# Patient Record
Sex: FEMALE | Race: WHITE | NOT HISPANIC OR LATINO | Employment: FULL TIME | URBAN - METROPOLITAN AREA
[De-identification: names, ages, dates, MRNs, and addresses within clinical notes are randomized per-mention and may not be internally consistent; named-entity substitution may affect disease eponyms.]

---

## 2017-03-24 ENCOUNTER — APPOINTMENT (OUTPATIENT)
Dept: WOMENS IMAGING | Facility: HOSPITAL | Age: 22
End: 2017-03-24

## 2017-03-24 PROCEDURE — 76641 ULTRASOUND BREAST COMPLETE: CPT | Performed by: RADIOLOGY

## 2018-07-20 ENCOUNTER — APPOINTMENT (OUTPATIENT)
Dept: WOMENS IMAGING | Facility: HOSPITAL | Age: 23
End: 2018-07-20

## 2018-07-20 PROCEDURE — 76641 ULTRASOUND BREAST COMPLETE: CPT | Performed by: RADIOLOGY

## 2022-11-01 ENCOUNTER — APPOINTMENT (OUTPATIENT)
Dept: CT IMAGING | Facility: HOSPITAL | Age: 27
End: 2022-11-01

## 2022-11-01 ENCOUNTER — HOSPITAL ENCOUNTER (OUTPATIENT)
Facility: HOSPITAL | Age: 27
Discharge: HOME | End: 2022-11-03
Attending: EMERGENCY MEDICINE | Admitting: OTOLARYNGOLOGY

## 2022-11-01 DIAGNOSIS — S10.0XXA: Primary | ICD-10-CM

## 2022-11-01 DIAGNOSIS — J39.0 PARAPHARYNGEAL ABSCESS: ICD-10-CM

## 2022-11-01 DIAGNOSIS — J39.1 PHARYNGEAL ABSCESS: ICD-10-CM

## 2022-11-01 LAB
ALBUMIN SERPL-MCNC: 4.2 G/DL (ref 3.5–5.2)
ALBUMIN/GLOB SERPL: 1.2 G/DL
ALP SERPL-CCNC: 53 U/L (ref 39–117)
ALT SERPL W P-5'-P-CCNC: 9 U/L (ref 1–33)
ANION GAP SERPL CALCULATED.3IONS-SCNC: 8.3 MMOL/L (ref 5–15)
AST SERPL-CCNC: 15 U/L (ref 1–32)
BASOPHILS # BLD AUTO: 0.04 10*3/MM3 (ref 0–0.2)
BASOPHILS NFR BLD AUTO: 0.2 % (ref 0–1.5)
BILIRUB SERPL-MCNC: 1.2 MG/DL (ref 0–1.2)
BUN SERPL-MCNC: 10 MG/DL (ref 6–20)
BUN/CREAT SERPL: 9.8 (ref 7–25)
CALCIUM SPEC-SCNC: 9.9 MG/DL (ref 8.6–10.5)
CHLORIDE SERPL-SCNC: 104 MMOL/L (ref 98–107)
CO2 SERPL-SCNC: 26.7 MMOL/L (ref 22–29)
CREAT SERPL-MCNC: 1.02 MG/DL (ref 0.57–1)
D-LACTATE SERPL-SCNC: 0.8 MMOL/L (ref 0.5–2)
DEPRECATED RDW RBC AUTO: 38.7 FL (ref 37–54)
EGFRCR SERPLBLD CKD-EPI 2021: 76.5 ML/MIN/1.73
EOSINOPHIL # BLD AUTO: 0.02 10*3/MM3 (ref 0–0.4)
EOSINOPHIL NFR BLD AUTO: 0.1 % (ref 0.3–6.2)
ERYTHROCYTE [DISTWIDTH] IN BLOOD BY AUTOMATED COUNT: 11.6 % (ref 12.3–15.4)
GLOBULIN UR ELPH-MCNC: 3.5 GM/DL
GLUCOSE SERPL-MCNC: 125 MG/DL (ref 65–99)
HCG SERPL QL: NEGATIVE
HCT VFR BLD AUTO: 40.8 % (ref 34–46.6)
HGB BLD-MCNC: 14.2 G/DL (ref 12–15.9)
IMM GRANULOCYTES # BLD AUTO: 0.07 10*3/MM3 (ref 0–0.05)
IMM GRANULOCYTES NFR BLD AUTO: 0.4 % (ref 0–0.5)
LYMPHOCYTES # BLD AUTO: 1.95 10*3/MM3 (ref 0.7–3.1)
LYMPHOCYTES NFR BLD AUTO: 11.9 % (ref 19.6–45.3)
MCH RBC QN AUTO: 31.4 PG (ref 26.6–33)
MCHC RBC AUTO-ENTMCNC: 34.8 G/DL (ref 31.5–35.7)
MCV RBC AUTO: 90.3 FL (ref 79–97)
MONOCYTES # BLD AUTO: 0.94 10*3/MM3 (ref 0.1–0.9)
MONOCYTES NFR BLD AUTO: 5.7 % (ref 5–12)
NEUTROPHILS NFR BLD AUTO: 13.35 10*3/MM3 (ref 1.7–7)
NEUTROPHILS NFR BLD AUTO: 81.7 % (ref 42.7–76)
NRBC BLD AUTO-RTO: 0 /100 WBC (ref 0–0.2)
PLATELET # BLD AUTO: 297 10*3/MM3 (ref 140–450)
PMV BLD AUTO: 10.1 FL (ref 6–12)
POTASSIUM SERPL-SCNC: 3.7 MMOL/L (ref 3.5–5.2)
PROT SERPL-MCNC: 7.7 G/DL (ref 6–8.5)
RBC # BLD AUTO: 4.52 10*6/MM3 (ref 3.77–5.28)
SODIUM SERPL-SCNC: 139 MMOL/L (ref 136–145)
WBC NRBC COR # BLD: 16.37 10*3/MM3 (ref 3.4–10.8)

## 2022-11-01 PROCEDURE — 25010000002 IOPAMIDOL 61 % SOLUTION: Performed by: EMERGENCY MEDICINE

## 2022-11-01 PROCEDURE — 96375 TX/PRO/DX INJ NEW DRUG ADDON: CPT

## 2022-11-01 PROCEDURE — 83605 ASSAY OF LACTIC ACID: CPT | Performed by: EMERGENCY MEDICINE

## 2022-11-01 PROCEDURE — 87040 BLOOD CULTURE FOR BACTERIA: CPT | Performed by: EMERGENCY MEDICINE

## 2022-11-01 PROCEDURE — 25010000002 DEXAMETHASONE PER 1 MG: Performed by: PHYSICIAN ASSISTANT

## 2022-11-01 PROCEDURE — G0378 HOSPITAL OBSERVATION PER HR: HCPCS

## 2022-11-01 PROCEDURE — 25010000002 HYDROMORPHONE PER 4 MG: Performed by: EMERGENCY MEDICINE

## 2022-11-01 PROCEDURE — 96365 THER/PROPH/DIAG IV INF INIT: CPT

## 2022-11-01 PROCEDURE — 80053 COMPREHEN METABOLIC PANEL: CPT | Performed by: PHYSICIAN ASSISTANT

## 2022-11-01 PROCEDURE — 70498 CT ANGIOGRAPHY NECK: CPT

## 2022-11-01 PROCEDURE — 82565 ASSAY OF CREATININE: CPT

## 2022-11-01 PROCEDURE — 85025 COMPLETE CBC W/AUTO DIFF WBC: CPT | Performed by: PHYSICIAN ASSISTANT

## 2022-11-01 PROCEDURE — 99284 EMERGENCY DEPT VISIT MOD MDM: CPT

## 2022-11-01 PROCEDURE — 84703 CHORIONIC GONADOTROPIN ASSAY: CPT | Performed by: PHYSICIAN ASSISTANT

## 2022-11-01 PROCEDURE — 25010000002 ONDANSETRON PER 1 MG: Performed by: EMERGENCY MEDICINE

## 2022-11-01 PROCEDURE — 25010000002 AMPICILLIN-SULBACTAM PER 1.5 G: Performed by: EMERGENCY MEDICINE

## 2022-11-01 RX ORDER — UREA 10 %
3 LOTION (ML) TOPICAL NIGHTLY PRN
Status: DISCONTINUED | OUTPATIENT
Start: 2022-11-01 | End: 2022-11-03 | Stop reason: HOSPADM

## 2022-11-01 RX ORDER — ONDANSETRON 4 MG/1
4 TABLET, FILM COATED ORAL EVERY 6 HOURS PRN
Status: DISCONTINUED | OUTPATIENT
Start: 2022-11-01 | End: 2022-11-03 | Stop reason: HOSPADM

## 2022-11-01 RX ORDER — ACETAMINOPHEN 325 MG/1
650 TABLET ORAL EVERY 4 HOURS PRN
Status: DISCONTINUED | OUTPATIENT
Start: 2022-11-01 | End: 2022-11-03 | Stop reason: HOSPADM

## 2022-11-01 RX ORDER — NITROGLYCERIN 0.4 MG/1
0.4 TABLET SUBLINGUAL
Status: DISCONTINUED | OUTPATIENT
Start: 2022-11-01 | End: 2022-11-03 | Stop reason: HOSPADM

## 2022-11-01 RX ORDER — DIPHENHYDRAMINE HYDROCHLORIDE AND LIDOCAINE HYDROCHLORIDE AND ALUMINUM HYDROXIDE AND MAGNESIUM HYDRO
10 KIT EVERY 6 HOURS SCHEDULED
Status: DISCONTINUED | OUTPATIENT
Start: 2022-11-02 | End: 2022-11-03 | Stop reason: HOSPADM

## 2022-11-01 RX ORDER — SODIUM CHLORIDE 0.9 % (FLUSH) 0.9 %
10 SYRINGE (ML) INJECTION AS NEEDED
Status: DISCONTINUED | OUTPATIENT
Start: 2022-11-01 | End: 2022-11-03 | Stop reason: HOSPADM

## 2022-11-01 RX ORDER — HYDROMORPHONE HYDROCHLORIDE 1 MG/ML
0.5 INJECTION, SOLUTION INTRAMUSCULAR; INTRAVENOUS; SUBCUTANEOUS EVERY 4 HOURS PRN
Status: DISCONTINUED | OUTPATIENT
Start: 2022-11-01 | End: 2022-11-02

## 2022-11-01 RX ORDER — ONDANSETRON 2 MG/ML
4 INJECTION INTRAMUSCULAR; INTRAVENOUS ONCE
Status: COMPLETED | OUTPATIENT
Start: 2022-11-01 | End: 2022-11-01

## 2022-11-01 RX ORDER — DEXAMETHASONE SODIUM PHOSPHATE 4 MG/ML
4 INJECTION, SOLUTION INTRA-ARTICULAR; INTRALESIONAL; INTRAMUSCULAR; INTRAVENOUS; SOFT TISSUE EVERY 8 HOURS
Status: DISCONTINUED | OUTPATIENT
Start: 2022-11-01 | End: 2022-11-03 | Stop reason: HOSPADM

## 2022-11-01 RX ORDER — ONDANSETRON 2 MG/ML
4 INJECTION INTRAMUSCULAR; INTRAVENOUS EVERY 6 HOURS PRN
Status: DISCONTINUED | OUTPATIENT
Start: 2022-11-01 | End: 2022-11-03 | Stop reason: HOSPADM

## 2022-11-01 RX ORDER — DEXAMETHASONE SODIUM PHOSPHATE 10 MG/ML
10 INJECTION INTRAMUSCULAR; INTRAVENOUS ONCE
Status: COMPLETED | OUTPATIENT
Start: 2022-11-01 | End: 2022-11-01

## 2022-11-01 RX ORDER — HYDROMORPHONE HYDROCHLORIDE 1 MG/ML
0.5 INJECTION, SOLUTION INTRAMUSCULAR; INTRAVENOUS; SUBCUTANEOUS ONCE
Status: COMPLETED | OUTPATIENT
Start: 2022-11-01 | End: 2022-11-01

## 2022-11-01 RX ADMIN — IOPAMIDOL 95 ML: 612 INJECTION, SOLUTION INTRAVENOUS at 14:15

## 2022-11-01 RX ADMIN — HYDROMORPHONE HYDROCHLORIDE 0.5 MG: 1 INJECTION, SOLUTION INTRAMUSCULAR; INTRAVENOUS; SUBCUTANEOUS at 15:21

## 2022-11-01 RX ADMIN — ONDANSETRON 4 MG: 2 INJECTION INTRAMUSCULAR; INTRAVENOUS at 15:21

## 2022-11-01 RX ADMIN — SODIUM CHLORIDE 1.5 G: 900 INJECTION INTRAVENOUS at 17:08

## 2022-11-01 RX ADMIN — DEXAMETHASONE SODIUM PHOSPHATE 10 MG: 10 INJECTION INTRAMUSCULAR; INTRAVENOUS at 15:10

## 2022-11-01 NOTE — ED PROVIDER NOTES
MD ATTESTATION NOTE    The ORALIA and I have discussed this patient's history, physical exam, and treatment plan.  I have reviewed the documentation and personally had a face to face interaction with the patient. I affirm the documentation and agree with the treatment and plan.  The attached note describes my personal findings.    I provided a substantive portion of the care of this patient. I personally performed the physical exam, in its entirety.    Allie Sunshine is a 27 y.o. female who presents to the ED c/o having swelling in her neck after having an accident on Saturday.  She reports on Saturday she was brushing her teeth when she was using the stairs.  She reports she fell.  She reports she jammed her toothbrush into the back of her throat.  She reports since then she has noticed swelling to her left neck and in the posterior aspect of her left throat.  She states today she had difficulty swallowing, change in her voice.  She is not on any blood thinners.  She denies any other injury.      On exam:  GENERAL: Awake, alert, no acute distress  SKIN: Warm, dry  HENT: Normocephalic, posterior pharynx with moderate to large swelling in the left.  There is asymmetry.  She has a hoarse voice.  She is managing her secretions  EYES: no scleral icterus  CV: regular rhythm, regular rate  RESPIRATORY: normal effort, lungs clear  ABDOMEN: soft, nontender, nondistended  MUSCULOSKELETAL: no deformity  NEURO: alert, moves all extremities, follows commands    Labs  Recent Results (from the past 24 hour(s))   Comprehensive Metabolic Panel    Collection Time: 11/01/22  2:14 PM    Specimen: Blood   Result Value Ref Range    Glucose 125 (H) 65 - 99 mg/dL    BUN 10 6 - 20 mg/dL    Creatinine 1.02 (H) 0.57 - 1.00 mg/dL    Sodium 139 136 - 145 mmol/L    Potassium 3.7 3.5 - 5.2 mmol/L    Chloride 104 98 - 107 mmol/L    CO2 26.7 22.0 - 29.0 mmol/L    Calcium 9.9 8.6 - 10.5 mg/dL    Total Protein 7.7 6.0 - 8.5 g/dL    Albumin 4.20 3.50 -  5.20 g/dL    ALT (SGPT) 9 1 - 33 U/L    AST (SGOT) 15 1 - 32 U/L    Alkaline Phosphatase 53 39 - 117 U/L    Total Bilirubin 1.2 0.0 - 1.2 mg/dL    Globulin 3.5 gm/dL    A/G Ratio 1.2 g/dL    BUN/Creatinine Ratio 9.8 7.0 - 25.0    Anion Gap 8.3 5.0 - 15.0 mmol/L    eGFR 76.5 >60.0 mL/min/1.73   hCG, Serum, Qualitative    Collection Time: 11/01/22  2:14 PM    Specimen: Blood   Result Value Ref Range    HCG Qualitative Negative Negative   CBC Auto Differential    Collection Time: 11/01/22  2:14 PM    Specimen: Blood   Result Value Ref Range    WBC 16.37 (H) 3.40 - 10.80 10*3/mm3    RBC 4.52 3.77 - 5.28 10*6/mm3    Hemoglobin 14.2 12.0 - 15.9 g/dL    Hematocrit 40.8 34.0 - 46.6 %    MCV 90.3 79.0 - 97.0 fL    MCH 31.4 26.6 - 33.0 pg    MCHC 34.8 31.5 - 35.7 g/dL    RDW 11.6 (L) 12.3 - 15.4 %    RDW-SD 38.7 37.0 - 54.0 fl    MPV 10.1 6.0 - 12.0 fL    Platelets 297 140 - 450 10*3/mm3    Neutrophil % 81.7 (H) 42.7 - 76.0 %    Lymphocyte % 11.9 (L) 19.6 - 45.3 %    Monocyte % 5.7 5.0 - 12.0 %    Eosinophil % 0.1 (L) 0.3 - 6.2 %    Basophil % 0.2 0.0 - 1.5 %    Immature Grans % 0.4 0.0 - 0.5 %    Neutrophils, Absolute 13.35 (H) 1.70 - 7.00 10*3/mm3    Lymphocytes, Absolute 1.95 0.70 - 3.10 10*3/mm3    Monocytes, Absolute 0.94 (H) 0.10 - 0.90 10*3/mm3    Eosinophils, Absolute 0.02 0.00 - 0.40 10*3/mm3    Basophils, Absolute 0.04 0.00 - 0.20 10*3/mm3    Immature Grans, Absolute 0.07 (H) 0.00 - 0.05 10*3/mm3    nRBC 0.0 0.0 - 0.2 /100 WBC   Lactic Acid, Plasma    Collection Time: 11/01/22  2:47 PM    Specimen: Blood   Result Value Ref Range    Lactate 0.8 0.5 - 2.0 mmol/L       Radiology  CT Angiogram Neck    Result Date: 11/1/2022  CT ANGIOGRAM OF THE NECK WITH CONTRAST  HISTORY: Trauma to left posterior pharynx, hematoma.  COMPARISON: None.  FINDINGS: The great vessels are arranged in a classic configuration. There is 0% stenosis of the internal carotid arteries using NASCET criteria. The vertebral arteries are codominant.   Decreased attenuation is noted involving the parapharyngeal soft tissues on the left with mild peripheral enhancement. This measures approximately 2.9 cm in AP dimension, 2.5 cm in transverse dimension and 2.6 cm in craniocaudal dimension. Decreased attenuation is also appreciated more medially, extending to the midline within the soft palate (more prominent superiorly). There is no evidence of active extravasation. The findings are most suggestive of an abscess or of an evolving abscess. There is a small collection of gas posterior laterally to the left measuring approximately 6 mm in size.      Decreased attenuation with mild peripheral enhancement involving the parapharyngeal soft tissues on the left measuring approximately 2.9 x 2.5 x 2.6 cm in size with adjacent area of decreased attenuation extending to the soft palate more prominent superiorly. The findings are most suggestive of an abscess/developing abscess. This study was hampered somewhat by patient motion but there was no evidence of a pseudoaneurysm or of active extravasation.  The above information was called to and discussed with Dr. Eastman.          Medical Decision Making:  ED Course as of 11/01/22 1756   Tue Nov 01, 2022   1350 Patient presents with left posterior pharyngeal injury after hitting her throat with a toothbrush 3 days ago.  Patient does have a visible expanding hematoma.  Concern for carotid injury.  No active, visible bleeding.  Will obtain CTA scan and discussed with vascular/ENT. [EE]   1357 I have spoken with Dr. Collins as well as Dr. Solis with radiology.  The recommendation is CTA of the neck.  I believe this needs to be performed emergently.  I have called CT to arrange her to be next in line.  We will not wait for pregnancy test or chemistries given the emergent nature of her presentation. [TR]   1434 WBC(!): 16.37 [EE]   1434 Hemoglobin: 14.2 [EE]   1438 Speaking with Dr. Chambers with radiology.  He states that the patient  has findings more consistent with an abscess.  She has a 2.9 x 2.5 x 2.6 parapharyngeal lesion that is crossing the midline at the soft palate.  She has white count of 16,000.  I am going to start her on some Unasyn and call ENT. [TR]   1456 Speaking with Dr. Charles with ENT.  He wants her started on antibiotics and steroids.  He will come to scope her in the emergency room.  I do not feel comfortable admitting her to the floor unless ENT scoped her and gives the okay.  We will hold her here until ENT can scope. [TR]   1621 Care turned over to Dr. Eastman pending ENT evaluation [EE]   1742 Speaking with ENT who has scoped her at the bedside.  He feels she is safe for the floor and does not need ICU care.  He states that he will see her early in the morning and decide further plan of action. [TR]   1755 Speaking with Dr. Muhammad with [facility].  Will admit. [TR]      ED Course User Index  [EE] Charles Gardiner PA  [TR] Davis Eastman MD       This is an emergent situation.  She has an expanding hematoma in her neck and this could potentially be from an injury to her carotid artery.  I will expedite emergent imaging of her neck.  I will speak with the radiologist to determine the best imaging modality.  She will then need to be evaluated by specialist and admitted to the hospital.      Procedures:  Critical Care  Performed by: Davis Eastman MD  Authorized by: Davis Eastman MD     Critical care provider statement:     Critical care time (minutes):  35    Critical care time was exclusive of:  Separately billable procedures and treating other patients    Critical care was necessary to treat or prevent imminent or life-threatening deterioration of the following conditions:  Circulatory failure and respiratory failure    Critical care was time spent personally by me on the following activities:  Development of treatment plan with patient or surrogate, discussions with consultants, evaluation of patient's response to  treatment, examination of patient, obtaining history from patient or surrogate, ordering and performing treatments and interventions, ordering and review of laboratory studies, ordering and review of radiographic studies, pulse oximetry, re-evaluation of patient's condition and review of old charts          PPE: The patient wore a mask and I wore an N95 mask throughout the entire patient encounter.      The patient qualifies to receive the vaccine, but they have not yet received it.    Diagnosis  Final diagnoses:   Contusion of pharynx   Pharyngeal abscess       Note Disclaimer: At [facility], we believe that sharing information builds trust and better relationships. You are receiving this note because you recently visited [facility]. It is possible you will see health information before a provider has talked with you about it. This kind of information can be easy to misunderstand. To help you fully understand what it means for your health, we urge you to discuss this note with your provider.     Davis Eastman MD  11/01/22 [id number]

## 2022-11-01 NOTE — ED TRIAGE NOTES
PAtient to ER via car from home for neck swelling post fall with tooth brush in her mouth on Saturday    Patient states that it is hard to breath and hard to swallow    Patient wearing mask this Rn in PPE

## 2022-11-01 NOTE — CONSULTS
We are requested to evaluate this very pleasant 27-year-old female with worsening left-sided sore throat.    3 days before admission she fell running on the stairs and the toothbrush fell to the ground and then projected into her mouth  creating an injury on the left side above the tonsil.    She states that when she looked into the mirror there was not a large gash but there was some blood.  She was doing reasonably well until 24 hours before admission when she developed progressive swelling in the throat and difficulty swallowing.    CT now shows an early developing fluid collection in the left parapharyngeal space as well as generalized edema extending into the hypopharynx inferiorly and to the soft palate superiorly.    Currently, the patient reports improvement since arriving in the ED and showed no evidence of distress.  She answers my questions clearly.    She reported no prior history of peritonsillar abscess or throat surgery.  She denied use of anticoagulant medication.    Patient's past medical history and events of current hospitalization were thoroughly reviewed as is pertinent to the chief complaint.    CT: Left parapharyngeal irregular fluid collection approximately 2.5 cm x 2.5 cm x 2.5 cm with general lateral hypopharyngeal wall edema.  Epiglottis appeared normal.  Glottis and trachea unremarkable.  1 small bubble of air in parapharyngeal space.    Physical exam: The patient is lying on the bed with her head at 45 degrees.  She appears calm.  Her voice is reasonably normal.  She has mild trismus.    Exam of the oropharynx shows significant left peritonsillar edema with shift of the edematous uvula beyond the midline.  There is some exudative change to the tonsil and some ecchymosis as well.  The left neck is not exquisitely tender and there is minimal swelling or mass-effect.    Transnasal flexible fiberoptic laryngoscopy was completed to the right nares.  Swelling of the soft palate and uvula is  present.  Lateral hypopharyngeal wall swelling and left peritonsillar swelling extends to the level of the epiglottis.    Epiglottis is completely normal as is the remainder of the supraglottic larynx, true vocal cords, arytenoids, and deeper portion of the hypopharynx.    Impression: Early left peritonsillar/parapharyngeal abscess in a patient with recent history of blunt and possibly penetrating trauma of the left peritonsillar space    Recommendation: Admission to the hospital for intravenous antibiotics and steroids; likely will take to the operating room tomorrow for incision and drainage parapharyngeal space abscess.    Findings of the examination including the endoscopic survey were discussed with the patient and her family members who were present in the room during the examination.  Plan for treatment was discussed including the potential need for surgical intervention.  The findings of the CT were discussed.    Both patient and family demonstrated understanding and agreement with the treatment plan.

## 2022-11-01 NOTE — ED NOTES
Nursing report ED to floor  Allie Sunshine  27 y.o.  female    HPI :   Chief Complaint   Patient presents with    neck swelling       Admitting doctor:   Lizette Muhammad MD    Admitting diagnosis:   The primary encounter diagnosis was Contusion of pharynx. A diagnosis of Pharyngeal abscess was also pertinent to this visit.    Code status:   Current Code Status       Date Active Code Status Order ID Comments User Context       11/1/2022 1822 CPR (Attempt to Resuscitate) [id number]  Lizette Muhammad MD ED        Question Answer    Code Status (Patient has no pulse and is not breathing) CPR (Attempt to Resuscitate)    Medical Interventions (Patient has pulse or is breathing) Full                    Allergies:   Patient has no known allergies.    Isolation:   No active isolations    Intake and Output  No intake or output data in the 24 hours ending 11/01/22 1844    Weight:   There were no vitals filed for this visit.    Most recent vitals:   Vitals:    11/01/22 1442 11/01/22 1453 11/01/22 1747 11/01/22 1753   BP: 124/72  115/64    Pulse: 86 83 69 71   Resp: 18      Temp:       SpO2: 99% 100% 96% 96%       Active LDAs/IV Access:   Lines, Drains & Airways       Active LDAs       Name Placement date Placement time Site Days    Peripheral IV 11/01/22 1330 Right Antecubital 11/01/22  1330  Antecubital  less than 1                    Labs (abnormal labs have a star):   Labs Reviewed   COMPREHENSIVE METABOLIC PANEL - Abnormal; Notable for the following components:       Result Value    Glucose 125 (*)     Creatinine 1.02 (*)     All other components within normal limits    Narrative:     GFR Normal >60  Chronic Kidney Disease <60  Kidney Failure <15     CBC WITH AUTO DIFFERENTIAL - Abnormal; Notable for the following components:    WBC 16.37 (*)     RDW 11.6 (*)     Neutrophil % 81.7 (*)     Lymphocyte % 11.9 (*)     Eosinophil % 0.1 (*)     Neutrophils, Absolute 13.35 (*)     Monocytes, Absolute 0.94 (*)      Immature Grans, Absolute 0.07 (*)     All other components within normal limits   HCG, SERUM, QUALITATIVE - Normal   LACTIC ACID, PLASMA - Normal   BLOOD CULTURE   BLOOD CULTURE   CBC AND DIFFERENTIAL    Narrative:     The following orders were created for panel order CBC & Differential.  Procedure                               Abnormality         Status                     ---------                               -----------         ------                     CBC Auto Differential[[id number]]        Abnormal            Final result                 Please view results for these tests on the individual orders.       EKG:   No orders to display       Meds given in ED:   Medications   sodium chloride 0.9 % flush 10 mL (has no administration in time range)   nitroglycerin (NITROSTAT) SL tablet 0.4 mg (has no administration in time range)   acetaminophen (TYLENOL) tablet 650 mg (has no administration in time range)   ondansetron (ZOFRAN) tablet 4 mg (has no administration in time range)     Or   ondansetron (ZOFRAN) injection 4 mg (has no administration in time range)   melatonin tablet 3 mg (has no administration in time range)   iopamidol (ISOVUE-300) 61 % injection 100 mL (95 mL Intravenous Given by Other 11/1/22 1415)   ampicillin-sulbactam 1.5g/100 mL 0.9% NS (MBP) (0 g Intravenous Stopped 11/1/22 1747)   dexamethasone (DECADRON) injection 10 mg (10 mg Intravenous Given 11/1/22 1510)   ondansetron (ZOFRAN) injection 4 mg (4 mg Intravenous Given 11/1/22 1521)   HYDROmorphone (DILAUDID) injection 0.5 mg (0.5 mg Intravenous Given 11/1/22 1521)       Imaging results:  CT Angiogram Neck    Result Date: 11/1/2022  Decreased attenuation with mild peripheral enhancement involving the parapharyngeal soft tissues on the left measuring approximately 2.9 x 2.5 x 2.6 cm in size with adjacent area of decreased attenuation extending to the soft palate more prominent superiorly. The findings are most suggestive of an  abscess/developing abscess. This study was hampered somewhat by patient motion but there was no evidence of a pseudoaneurysm or of active extravasation.  The above information was called to and discussed with Dr. Eastman.         Ambulatory status:   - Up adlib    Social issues:   Social History     Socioeconomic History    Marital status: Single       NIH Stroke Scale:         Registered Nurse, RN  11/01/22 18:44 EDT

## 2022-11-01 NOTE — ED PROVIDER NOTES
EMERGENCY DEPARTMENT ENCOUNTER    Room Number:  02/02  Date of encounter:  11/1/2022  PCP: Provider, No Known  Historian: Patient, father      I used full protective equipment while examining this patient.  This includes face mask, gloves and protective eyewear.  I washed my hands before entering the room and immediately upon leaving the room      HPI:  Chief Complaint: Throat injury  A complete HPI/ROS/PMH/PSH/SH/FH are unobtainable due to: Nothing    Context: Allie Sunshine is a 27 y.o. female who presents to the ED c/o throat injury 3 days ago.  Patient states she was running up the stairs with a toothbrush in her mouth, when she tripped and fell forward.  She hit the toothbrush on the ground and poked herself in the back of the throat with the toothbrush.  Patient states immediately she had pain and bleeding out of the posterior throat.  She states the bleeding and pain eventually improved.  She states over the past 2 days she has been talking more and is noticed that her throat is more swollen and painful.  She states that she has difficult time speaking.  She has a difficult time swallowing.  She denies any difficulty breathing at this time.  She takes no blood thinners.    Review of Medical Records  I reviewed patient's last GYN office visit from 8/23/2022.  Patient was seen for routine exam.    PAST MEDICAL HISTORY  Active Ambulatory Problems     Diagnosis Date Noted   • No Active Ambulatory Problems     Resolved Ambulatory Problems     Diagnosis Date Noted   • No Resolved Ambulatory Problems     No Additional Past Medical History         PAST SURGICAL HISTORY  No past surgical history on file.      FAMILY HISTORY  No family history on file.      SOCIAL HISTORY  Social History     Socioeconomic History   • Marital status: Single         ALLERGIES  Patient has no known allergies.        REVIEW OF SYSTEMS  All systems reviewed and negative except for those discussed in HPI.       PHYSICAL EXAM    I have  reviewed the triage vital signs and nursing notes.    ED Triage Vitals [11/01/22 1324]   Temp Heart Rate Resp BP SpO2   97.4 °F (36.3 °C) 94 18 -- 100 %      Temp src Heart Rate Source Patient Position BP Location FiO2 (%)   -- -- -- -- --       Physical Exam  GENERAL: Alert, oriented, not distressed  HENT: Significant swelling to the left posterior oropharynx with asymmetry.  Patient has a muffled voice.  No active bleeding.  EYES: no scleral icterus, EOMI  CV: regular rhythm, regular rate, no murmur  RESPIRATORY: normal effort, CTA  ABDOMEN: soft, nontender  MUSCULOSKELETAL: no deformity, FROM, no calf swelling or tenderness  NEURO: alert, moves all extremities, follows commands  SKIN: warm, dry        LAB RESULTS  Recent Results (from the past 24 hour(s))   Comprehensive Metabolic Panel    Collection Time: 11/01/22  2:14 PM    Specimen: Blood   Result Value Ref Range    Glucose 125 (H) 65 - 99 mg/dL    BUN 10 6 - 20 mg/dL    Creatinine 1.02 (H) 0.57 - 1.00 mg/dL    Sodium 139 136 - 145 mmol/L    Potassium 3.7 3.5 - 5.2 mmol/L    Chloride 104 98 - 107 mmol/L    CO2 26.7 22.0 - 29.0 mmol/L    Calcium 9.9 8.6 - 10.5 mg/dL    Total Protein 7.7 6.0 - 8.5 g/dL    Albumin 4.20 3.50 - 5.20 g/dL    ALT (SGPT) 9 1 - 33 U/L    AST (SGOT) 15 1 - 32 U/L    Alkaline Phosphatase 53 39 - 117 U/L    Total Bilirubin 1.2 0.0 - 1.2 mg/dL    Globulin 3.5 gm/dL    A/G Ratio 1.2 g/dL    BUN/Creatinine Ratio 9.8 7.0 - 25.0    Anion Gap 8.3 5.0 - 15.0 mmol/L    eGFR 76.5 >60.0 mL/min/1.73   hCG, Serum, Qualitative    Collection Time: 11/01/22  2:14 PM    Specimen: Blood   Result Value Ref Range    HCG Qualitative Negative Negative   CBC Auto Differential    Collection Time: 11/01/22  2:14 PM    Specimen: Blood   Result Value Ref Range    WBC 16.37 (H) 3.40 - 10.80 10*3/mm3    RBC 4.52 3.77 - 5.28 10*6/mm3    Hemoglobin 14.2 12.0 - 15.9 g/dL    Hematocrit 40.8 34.0 - 46.6 %    MCV 90.3 79.0 - 97.0 fL    MCH 31.4 26.6 - 33.0 pg    MCHC  34.8 31.5 - 35.7 g/dL    RDW 11.6 (L) 12.3 - 15.4 %    RDW-SD 38.7 37.0 - 54.0 fl    MPV 10.1 6.0 - 12.0 fL    Platelets 297 140 - 450 10*3/mm3    Neutrophil % 81.7 (H) 42.7 - 76.0 %    Lymphocyte % 11.9 (L) 19.6 - 45.3 %    Monocyte % 5.7 5.0 - 12.0 %    Eosinophil % 0.1 (L) 0.3 - 6.2 %    Basophil % 0.2 0.0 - 1.5 %    Immature Grans % 0.4 0.0 - 0.5 %    Neutrophils, Absolute 13.35 (H) 1.70 - 7.00 10*3/mm3    Lymphocytes, Absolute 1.95 0.70 - 3.10 10*3/mm3    Monocytes, Absolute 0.94 (H) 0.10 - 0.90 10*3/mm3    Eosinophils, Absolute 0.02 0.00 - 0.40 10*3/mm3    Basophils, Absolute 0.04 0.00 - 0.20 10*3/mm3    Immature Grans, Absolute 0.07 (H) 0.00 - 0.05 10*3/mm3    nRBC 0.0 0.0 - 0.2 /100 WBC   Lactic Acid, Plasma    Collection Time: 11/01/22  2:47 PM    Specimen: Blood   Result Value Ref Range    Lactate 0.8 0.5 - 2.0 mmol/L       Ordered the above labs and independently reviewed the results.        RADIOLOGY  CT Angiogram Neck    Result Date: 11/1/2022  CT ANGIOGRAM OF THE NECK WITH CONTRAST  HISTORY: Trauma to left posterior pharynx, hematoma.  COMPARISON: None.  FINDINGS: The great vessels are arranged in a classic configuration. There is 0% stenosis of the internal carotid arteries using NASCET criteria. The vertebral arteries are codominant.  Decreased attenuation is noted involving the parapharyngeal soft tissues on the left with mild peripheral enhancement. This measures approximately 2.9 cm in AP dimension, 2.5 cm in transverse dimension and 2.6 cm in craniocaudal dimension. Decreased attenuation is also appreciated more medially, extending to the midline within the soft palate (more prominent superiorly). There is no evidence of active extravasation. The findings are most suggestive of an abscess or of an evolving abscess. There is a small collection of gas posterior laterally to the left measuring approximately 6 mm in size.      Decreased attenuation with mild peripheral enhancement involving the  parapharyngeal soft tissues on the left measuring approximately 2.9 x 2.5 x 2.6 cm in size with adjacent area of decreased attenuation extending to the soft palate more prominent superiorly. The findings are most suggestive of an abscess/developing abscess. This study was hampered somewhat by patient motion but there was no evidence of a pseudoaneurysm or of active extravasation.  The above information was called to and discussed with Dr. Eastman.          I ordered the above noted radiological studies. Reviewed by me and discussed with radiologist.  See dictation for official radiology interpretation.      MEDICATIONS GIVEN IN ER    Medications   sodium chloride 0.9 % flush 10 mL (has no administration in time range)   ampicillin-sulbactam 1.5g/100 mL 0.9% NS (MBP) (has no administration in time range)   iopamidol (ISOVUE-300) 61 % injection 100 mL (95 mL Intravenous Given by Other 11/1/22 1415)   dexamethasone (DECADRON) injection 10 mg (10 mg Intravenous Given 11/1/22 1510)   ondansetron (ZOFRAN) injection 4 mg (4 mg Intravenous Given 11/1/22 1521)   HYDROmorphone (DILAUDID) injection 0.5 mg (0.5 mg Intravenous Given 11/1/22 1521)         PROGRESS, DATA ANALYSIS, CONSULTS, AND MEDICAL DECISION MAKING    All labs have been independently reviewed by me.  All radiology studies have been reviewed by me and discussed with radiologist dictating the report.   EKG's independently viewed and interpreted by me.  Discussion below represents my analysis of pertinent findings related to patient's condition, differential diagnosis, treatment plan and final disposition.    I have discussed case with Dr. Eastman, emergency room physician.  He has performed his own bedside examination and agrees with treatment plan.    ED Course as of 11/01/22 1621   Tue Nov 01, 2022   1350 Patient presents with left posterior pharyngeal injury after hitting her throat with a toothbrush 3 days ago.  Patient does have a visible expanding hematoma.   Concern for carotid injury.  No active, visible bleeding.  Will obtain CTA scan and discussed with vascular/ENT. [EE]   1357 I have spoken with Dr. Collins as well as Dr. Solis with radiology.  The recommendation is CTA of the neck.  I believe this needs to be performed emergently.  I have called CT to arrange her to be next in line.  We will not wait for pregnancy test or chemistries given the emergent nature of her presentation. [TR]   1434 WBC(!): 16.37 [EE]   1434 Hemoglobin: 14.2 [EE]   1438 Speaking with Dr. Chambers with radiology.  He states that the patient has findings more consistent with an abscess.  She has a 2.9 x 2.5 x 2.6 parapharyngeal lesion that is crossing the midline at the soft palate.  She has white count of 16,000.  I am going to start her on some Unasyn and call ENT. [TR]   1456 Speaking with Dr. Charles with ENT.  He wants her started on antibiotics and steroids.  He will come to scope her in the emergency room.  I do not feel comfortable admitting her to the floor unless ENT scoped her and gives the okay.  We will hold her here until ENT can scope. [TR]   1621 Care turned over to Dr. Eastman pending ENT evaluation [EE]      ED Course User Index  [EE] Charles Gardiner PA  [TR] Davis Eastman MD       AS OF 16:21 EDT VITALS:    BP - 124/72  HR - 86  TEMP - 97.4 °F (36.3 °C)  O2 SATS - 99%        DIAGNOSIS  Final diagnoses:   Contusion of pharynx         DISPOSITION  Admitted      Dictated utilizing Dragon dictation     Charles Gardiner PA  11/01/22 1621

## 2022-11-02 ENCOUNTER — ANESTHESIA EVENT (OUTPATIENT)
Dept: PERIOP | Facility: HOSPITAL | Age: 27
End: 2022-11-02

## 2022-11-02 ENCOUNTER — ANESTHESIA (OUTPATIENT)
Dept: PERIOP | Facility: HOSPITAL | Age: 27
End: 2022-11-02

## 2022-11-02 PROBLEM — D72.829 LEUKOCYTOSIS: Status: ACTIVE | Noted: 2022-11-02

## 2022-11-02 PROBLEM — J39.0 PARAPHARYNGEAL ABSCESS: Status: ACTIVE | Noted: 2022-11-02

## 2022-11-02 LAB
ANION GAP SERPL CALCULATED.3IONS-SCNC: 10 MMOL/L (ref 5–15)
BUN SERPL-MCNC: 11 MG/DL (ref 6–20)
BUN/CREAT SERPL: 14.3 (ref 7–25)
CALCIUM SPEC-SCNC: 9.3 MG/DL (ref 8.6–10.5)
CHLORIDE SERPL-SCNC: 103 MMOL/L (ref 98–107)
CO2 SERPL-SCNC: 26 MMOL/L (ref 22–29)
CREAT BLDA-MCNC: 0.8 MG/DL (ref 0.6–1.3)
CREAT SERPL-MCNC: 0.77 MG/DL (ref 0.57–1)
DEPRECATED RDW RBC AUTO: 40 FL (ref 37–54)
EGFRCR SERPLBLD CKD-EPI 2021: 107.2 ML/MIN/1.73
ERYTHROCYTE [DISTWIDTH] IN BLOOD BY AUTOMATED COUNT: 11.8 % (ref 12.3–15.4)
GLUCOSE SERPL-MCNC: 115 MG/DL (ref 65–99)
HCT VFR BLD AUTO: 36.8 % (ref 34–46.6)
HGB BLD-MCNC: 12.4 G/DL (ref 12–15.9)
MCH RBC QN AUTO: 31.2 PG (ref 26.6–33)
MCHC RBC AUTO-ENTMCNC: 33.7 G/DL (ref 31.5–35.7)
MCV RBC AUTO: 92.5 FL (ref 79–97)
PLATELET # BLD AUTO: 268 10*3/MM3 (ref 140–450)
PMV BLD AUTO: 9.6 FL (ref 6–12)
POTASSIUM SERPL-SCNC: 4.4 MMOL/L (ref 3.5–5.2)
RBC # BLD AUTO: 3.98 10*6/MM3 (ref 3.77–5.28)
SODIUM SERPL-SCNC: 139 MMOL/L (ref 136–145)
WBC NRBC COR # BLD: 15.49 10*3/MM3 (ref 3.4–10.8)

## 2022-11-02 PROCEDURE — 85027 COMPLETE CBC AUTOMATED: CPT | Performed by: INTERNAL MEDICINE

## 2022-11-02 PROCEDURE — 87186 SC STD MICRODIL/AGAR DIL: CPT | Performed by: OTOLARYNGOLOGY

## 2022-11-02 PROCEDURE — 25010000002 ONDANSETRON PER 1 MG: Performed by: INTERNAL MEDICINE

## 2022-11-02 PROCEDURE — 25010000002 DEXAMETHASONE SODIUM PHOSPHATE 20 MG/5ML SOLUTION: Performed by: NURSE ANESTHETIST, CERTIFIED REGISTERED

## 2022-11-02 PROCEDURE — 80048 BASIC METABOLIC PNL TOTAL CA: CPT | Performed by: INTERNAL MEDICINE

## 2022-11-02 PROCEDURE — 87015 SPECIMEN INFECT AGNT CONCNTJ: CPT | Performed by: OTOLARYNGOLOGY

## 2022-11-02 PROCEDURE — 25010000002 ONDANSETRON PER 1 MG: Performed by: OTOLARYNGOLOGY

## 2022-11-02 PROCEDURE — 25010000002 DEXAMETHASONE SODIUM PHOSPHATE 20 MG/5ML SOLUTION: Performed by: OTOLARYNGOLOGY

## 2022-11-02 PROCEDURE — 87147 CULTURE TYPE IMMUNOLOGIC: CPT | Performed by: OTOLARYNGOLOGY

## 2022-11-02 PROCEDURE — 25010000002 MIDAZOLAM PER 1 MG: Performed by: ANESTHESIOLOGY

## 2022-11-02 PROCEDURE — 25010000002 FENTANYL CITRATE (PF) 100 MCG/2ML SOLUTION: Performed by: NURSE ANESTHETIST, CERTIFIED REGISTERED

## 2022-11-02 PROCEDURE — 87077 CULTURE AEROBIC IDENTIFY: CPT | Performed by: OTOLARYNGOLOGY

## 2022-11-02 PROCEDURE — G0378 HOSPITAL OBSERVATION PER HR: HCPCS

## 2022-11-02 PROCEDURE — 96366 THER/PROPH/DIAG IV INF ADDON: CPT

## 2022-11-02 PROCEDURE — 96376 TX/PRO/DX INJ SAME DRUG ADON: CPT

## 2022-11-02 PROCEDURE — 87205 SMEAR GRAM STAIN: CPT | Performed by: OTOLARYNGOLOGY

## 2022-11-02 PROCEDURE — 25010000002 DEXAMETHASONE SODIUM PHOSPHATE 20 MG/5ML SOLUTION: Performed by: INTERNAL MEDICINE

## 2022-11-02 PROCEDURE — 25010000002 AMPICILLIN-SULBACTAM PER 1.5 G: Performed by: INTERNAL MEDICINE

## 2022-11-02 PROCEDURE — 87070 CULTURE OTHR SPECIMN AEROBIC: CPT | Performed by: OTOLARYNGOLOGY

## 2022-11-02 PROCEDURE — 25010000002 PROPOFOL 10 MG/ML EMULSION: Performed by: NURSE ANESTHETIST, CERTIFIED REGISTERED

## 2022-11-02 PROCEDURE — 25010000002 AMPICILLIN-SULBACTAM PER 1.5 G: Performed by: OTOLARYNGOLOGY

## 2022-11-02 PROCEDURE — 25010000002 HYDROMORPHONE PER 4 MG: Performed by: INTERNAL MEDICINE

## 2022-11-02 PROCEDURE — 25010000002 ONDANSETRON PER 1 MG: Performed by: NURSE ANESTHETIST, CERTIFIED REGISTERED

## 2022-11-02 PROCEDURE — 25010000002 FENTANYL CITRATE (PF) 50 MCG/ML SOLUTION: Performed by: NURSE ANESTHETIST, CERTIFIED REGISTERED

## 2022-11-02 PROCEDURE — 36415 COLL VENOUS BLD VENIPUNCTURE: CPT | Performed by: INTERNAL MEDICINE

## 2022-11-02 RX ORDER — LIDOCAINE HYDROCHLORIDE 20 MG/ML
INJECTION, SOLUTION INFILTRATION; PERINEURAL AS NEEDED
Status: DISCONTINUED | OUTPATIENT
Start: 2022-11-02 | End: 2022-11-02 | Stop reason: SURG

## 2022-11-02 RX ORDER — FLUMAZENIL 0.1 MG/ML
0.2 INJECTION INTRAVENOUS AS NEEDED
Status: DISCONTINUED | OUTPATIENT
Start: 2022-11-02 | End: 2022-11-02

## 2022-11-02 RX ORDER — PROMETHAZINE HYDROCHLORIDE 25 MG/1
25 TABLET ORAL ONCE AS NEEDED
Status: DISCONTINUED | OUTPATIENT
Start: 2022-11-02 | End: 2022-11-02

## 2022-11-02 RX ORDER — SPIRONOLACTONE 100 MG/1
100 TABLET, FILM COATED ORAL DAILY
Status: DISCONTINUED | OUTPATIENT
Start: 2022-11-02 | End: 2022-11-03 | Stop reason: HOSPADM

## 2022-11-02 RX ORDER — HYDROMORPHONE HYDROCHLORIDE 1 MG/ML
0.5 INJECTION, SOLUTION INTRAMUSCULAR; INTRAVENOUS; SUBCUTANEOUS
Status: DISCONTINUED | OUTPATIENT
Start: 2022-11-02 | End: 2022-11-02

## 2022-11-02 RX ORDER — SODIUM CHLORIDE 0.9 % (FLUSH) 0.9 %
3-10 SYRINGE (ML) INJECTION AS NEEDED
Status: DISCONTINUED | OUTPATIENT
Start: 2022-11-02 | End: 2022-11-02 | Stop reason: HOSPADM

## 2022-11-02 RX ORDER — OXYCODONE AND ACETAMINOPHEN 7.5; 325 MG/1; MG/1
1 TABLET ORAL EVERY 4 HOURS PRN
Status: DISCONTINUED | OUTPATIENT
Start: 2022-11-02 | End: 2022-11-02 | Stop reason: HOSPADM

## 2022-11-02 RX ORDER — IBUPROFEN 600 MG/1
600 TABLET ORAL ONCE AS NEEDED
Status: DISCONTINUED | OUTPATIENT
Start: 2022-11-02 | End: 2022-11-02

## 2022-11-02 RX ORDER — EPHEDRINE SULFATE 50 MG/ML
5 INJECTION, SOLUTION INTRAVENOUS ONCE AS NEEDED
Status: DISCONTINUED | OUTPATIENT
Start: 2022-11-02 | End: 2022-11-02

## 2022-11-02 RX ORDER — NORETHINDRONE ACETATE AND ETHINYL ESTRADIOL .03; 1.5 MG/1; MG/1
1 TABLET ORAL DAILY
COMMUNITY

## 2022-11-02 RX ORDER — DIPHENHYDRAMINE HCL 25 MG
25 CAPSULE ORAL
Status: DISCONTINUED | OUTPATIENT
Start: 2022-11-02 | End: 2022-11-02

## 2022-11-02 RX ORDER — ROCURONIUM BROMIDE 10 MG/ML
INJECTION, SOLUTION INTRAVENOUS AS NEEDED
Status: DISCONTINUED | OUTPATIENT
Start: 2022-11-02 | End: 2022-11-02 | Stop reason: SURG

## 2022-11-02 RX ORDER — DIPHENHYDRAMINE HYDROCHLORIDE 50 MG/ML
12.5 INJECTION INTRAMUSCULAR; INTRAVENOUS
Status: DISCONTINUED | OUTPATIENT
Start: 2022-11-02 | End: 2022-11-02

## 2022-11-02 RX ORDER — LIDOCAINE HYDROCHLORIDE 10 MG/ML
0.5 INJECTION, SOLUTION EPIDURAL; INFILTRATION; INTRACAUDAL; PERINEURAL ONCE AS NEEDED
Status: DISCONTINUED | OUTPATIENT
Start: 2022-11-02 | End: 2022-11-02 | Stop reason: HOSPADM

## 2022-11-02 RX ORDER — FAMOTIDINE 10 MG/ML
20 INJECTION, SOLUTION INTRAVENOUS ONCE
Status: COMPLETED | OUTPATIENT
Start: 2022-11-02 | End: 2022-11-02

## 2022-11-02 RX ORDER — ACETAMINOPHEN 500 MG
500 TABLET ORAL EVERY 6 HOURS PRN
COMMUNITY

## 2022-11-02 RX ORDER — LABETALOL HYDROCHLORIDE 5 MG/ML
5 INJECTION, SOLUTION INTRAVENOUS
Status: DISCONTINUED | OUTPATIENT
Start: 2022-11-02 | End: 2022-11-02

## 2022-11-02 RX ORDER — FENTANYL CITRATE 50 UG/ML
50 INJECTION, SOLUTION INTRAMUSCULAR; INTRAVENOUS
Status: DISCONTINUED | OUTPATIENT
Start: 2022-11-02 | End: 2022-11-02

## 2022-11-02 RX ORDER — SODIUM CHLORIDE 0.9 % (FLUSH) 0.9 %
3 SYRINGE (ML) INJECTION EVERY 12 HOURS SCHEDULED
Status: DISCONTINUED | OUTPATIENT
Start: 2022-11-02 | End: 2022-11-02 | Stop reason: HOSPADM

## 2022-11-02 RX ORDER — HYDROCODONE BITARTRATE AND ACETAMINOPHEN 7.5; 325 MG/1; MG/1
1 TABLET ORAL ONCE AS NEEDED
Status: DISCONTINUED | OUTPATIENT
Start: 2022-11-02 | End: 2022-11-02

## 2022-11-02 RX ORDER — MIDAZOLAM HYDROCHLORIDE 1 MG/ML
1 INJECTION INTRAMUSCULAR; INTRAVENOUS
Status: DISCONTINUED | OUTPATIENT
Start: 2022-11-02 | End: 2022-11-02 | Stop reason: HOSPADM

## 2022-11-02 RX ORDER — HYDRALAZINE HYDROCHLORIDE 20 MG/ML
5 INJECTION INTRAMUSCULAR; INTRAVENOUS
Status: DISCONTINUED | OUTPATIENT
Start: 2022-11-02 | End: 2022-11-02

## 2022-11-02 RX ORDER — PROMETHAZINE HYDROCHLORIDE 25 MG/1
25 SUPPOSITORY RECTAL ONCE AS NEEDED
Status: DISCONTINUED | OUTPATIENT
Start: 2022-11-02 | End: 2022-11-02

## 2022-11-02 RX ORDER — PROPOFOL 10 MG/ML
VIAL (ML) INTRAVENOUS AS NEEDED
Status: DISCONTINUED | OUTPATIENT
Start: 2022-11-02 | End: 2022-11-02 | Stop reason: SURG

## 2022-11-02 RX ORDER — NALOXONE HCL 0.4 MG/ML
0.2 VIAL (ML) INJECTION AS NEEDED
Status: DISCONTINUED | OUTPATIENT
Start: 2022-11-02 | End: 2022-11-02 | Stop reason: HOSPADM

## 2022-11-02 RX ORDER — ACETAMINOPHEN 500 MG
500 TABLET ORAL EVERY 6 HOURS PRN
Status: DISCONTINUED | OUTPATIENT
Start: 2022-11-02 | End: 2022-11-03 | Stop reason: HOSPADM

## 2022-11-02 RX ORDER — SPIRONOLACTONE 100 MG/1
100 TABLET, FILM COATED ORAL DAILY
COMMUNITY

## 2022-11-02 RX ORDER — IBUPROFEN 200 MG
600 TABLET ORAL EVERY 6 HOURS PRN
COMMUNITY

## 2022-11-02 RX ORDER — MAGNESIUM HYDROXIDE 1200 MG/15ML
LIQUID ORAL AS NEEDED
Status: DISCONTINUED | OUTPATIENT
Start: 2022-11-02 | End: 2022-11-02 | Stop reason: HOSPADM

## 2022-11-02 RX ORDER — ONDANSETRON 2 MG/ML
INJECTION INTRAMUSCULAR; INTRAVENOUS AS NEEDED
Status: DISCONTINUED | OUTPATIENT
Start: 2022-11-02 | End: 2022-11-02 | Stop reason: SURG

## 2022-11-02 RX ORDER — ONDANSETRON 2 MG/ML
4 INJECTION INTRAMUSCULAR; INTRAVENOUS ONCE AS NEEDED
Status: DISCONTINUED | OUTPATIENT
Start: 2022-11-02 | End: 2022-11-02

## 2022-11-02 RX ORDER — SODIUM CHLORIDE, SODIUM LACTATE, POTASSIUM CHLORIDE, CALCIUM CHLORIDE 600; 310; 30; 20 MG/100ML; MG/100ML; MG/100ML; MG/100ML
9 INJECTION, SOLUTION INTRAVENOUS CONTINUOUS
Status: DISCONTINUED | OUTPATIENT
Start: 2022-11-02 | End: 2022-11-03 | Stop reason: HOSPADM

## 2022-11-02 RX ORDER — FENTANYL CITRATE 50 UG/ML
50 INJECTION, SOLUTION INTRAMUSCULAR; INTRAVENOUS
Status: DISCONTINUED | OUTPATIENT
Start: 2022-11-02 | End: 2022-11-02 | Stop reason: HOSPADM

## 2022-11-02 RX ORDER — DEXAMETHASONE SODIUM PHOSPHATE 4 MG/ML
INJECTION, SOLUTION INTRA-ARTICULAR; INTRALESIONAL; INTRAMUSCULAR; INTRAVENOUS; SOFT TISSUE AS NEEDED
Status: DISCONTINUED | OUTPATIENT
Start: 2022-11-02 | End: 2022-11-02 | Stop reason: SURG

## 2022-11-02 RX ORDER — GLYCOPYRROLATE 0.2 MG/ML
0.1 INJECTION INTRAMUSCULAR; INTRAVENOUS ONCE
Status: COMPLETED | OUTPATIENT
Start: 2022-11-02 | End: 2022-11-02

## 2022-11-02 RX ORDER — FENTANYL CITRATE 50 UG/ML
INJECTION, SOLUTION INTRAMUSCULAR; INTRAVENOUS AS NEEDED
Status: DISCONTINUED | OUTPATIENT
Start: 2022-11-02 | End: 2022-11-02 | Stop reason: SURG

## 2022-11-02 RX ADMIN — LIDOCAINE HYDROCHLORIDE 100 MG: 20 INJECTION, SOLUTION INFILTRATION; PERINEURAL at 11:59

## 2022-11-02 RX ADMIN — ONDANSETRON 4 MG: 2 INJECTION INTRAMUSCULAR; INTRAVENOUS at 06:20

## 2022-11-02 RX ADMIN — SODIUM CHLORIDE 1.5 G: 900 INJECTION INTRAVENOUS at 11:40

## 2022-11-02 RX ADMIN — ONDANSETRON 4 MG: 2 INJECTION INTRAMUSCULAR; INTRAVENOUS at 12:14

## 2022-11-02 RX ADMIN — SUGAMMADEX 150 MG: 100 INJECTION, SOLUTION INTRAVENOUS at 12:22

## 2022-11-02 RX ADMIN — SODIUM CHLORIDE, POTASSIUM CHLORIDE, SODIUM LACTATE AND CALCIUM CHLORIDE 9 ML/HR: 600; 310; 30; 20 INJECTION, SOLUTION INTRAVENOUS at 10:44

## 2022-11-02 RX ADMIN — ACETAMINOPHEN 650 MG: 325 TABLET, FILM COATED ORAL at 16:13

## 2022-11-02 RX ADMIN — SODIUM CHLORIDE 1.5 G: 900 INJECTION INTRAVENOUS at 06:20

## 2022-11-02 RX ADMIN — DEXAMETHASONE SODIUM PHOSPHATE 8 MG: 4 INJECTION, SOLUTION INTRAMUSCULAR; INTRAVENOUS at 12:08

## 2022-11-02 RX ADMIN — MIDAZOLAM 1 MG: 1 INJECTION, SOLUTION INTRAMUSCULAR; INTRAVENOUS at 10:44

## 2022-11-02 RX ADMIN — SODIUM CHLORIDE 1.5 G: 900 INJECTION INTRAVENOUS at 18:06

## 2022-11-02 RX ADMIN — ROCURONIUM BROMIDE 40 MG: 10 INJECTION, SOLUTION INTRAVENOUS at 12:01

## 2022-11-02 RX ADMIN — DEXAMETHASONE SODIUM PHOSPHATE 4 MG: 4 INJECTION, SOLUTION INTRAMUSCULAR; INTRAVENOUS at 18:05

## 2022-11-02 RX ADMIN — FENTANYL CITRATE 50 MCG: 50 INJECTION INTRAMUSCULAR; INTRAVENOUS at 12:43

## 2022-11-02 RX ADMIN — GLYCOPYRROLATE 0.1 MG: 0.2 INJECTION, SOLUTION INTRAMUSCULAR; INTRAVENOUS at 10:43

## 2022-11-02 RX ADMIN — DIPHENHYDRAMINE HYDROCHLORIDE AND LIDOCAINE HYDROCHLORIDE AND ALUMINUM HYDROXIDE AND MAGNESIUM HYDRO 10 ML: KIT at 18:05

## 2022-11-02 RX ADMIN — ONDANSETRON 4 MG: 2 INJECTION INTRAMUSCULAR; INTRAVENOUS at 00:48

## 2022-11-02 RX ADMIN — SODIUM CHLORIDE 1.5 G: 900 INJECTION INTRAVENOUS at 01:12

## 2022-11-02 RX ADMIN — HYDROMORPHONE HYDROCHLORIDE 0.5 MG: 1 INJECTION, SOLUTION INTRAMUSCULAR; INTRAVENOUS; SUBCUTANEOUS at 00:48

## 2022-11-02 RX ADMIN — PROPOFOL 150 MG: 10 INJECTION, EMULSION INTRAVENOUS at 12:01

## 2022-11-02 RX ADMIN — FENTANYL CITRATE 100 MCG: 50 INJECTION, SOLUTION INTRAMUSCULAR; INTRAVENOUS at 11:59

## 2022-11-02 RX ADMIN — ONDANSETRON 4 MG: 2 INJECTION INTRAMUSCULAR; INTRAVENOUS at 18:07

## 2022-11-02 RX ADMIN — DEXAMETHASONE SODIUM PHOSPHATE 4 MG: 4 INJECTION, SOLUTION INTRAMUSCULAR; INTRAVENOUS at 06:20

## 2022-11-02 RX ADMIN — ACETAMINOPHEN 650 MG: 325 TABLET, FILM COATED ORAL at 20:40

## 2022-11-02 RX ADMIN — Medication 3 MG: at 20:45

## 2022-11-02 RX ADMIN — FAMOTIDINE 20 MG: 10 INJECTION INTRAVENOUS at 10:44

## 2022-11-02 RX ADMIN — DEXAMETHASONE SODIUM PHOSPHATE 4 MG: 4 INJECTION, SOLUTION INTRAMUSCULAR; INTRAVENOUS at 01:12

## 2022-11-02 NOTE — PLAN OF CARE
Problem: Adult Inpatient Plan of Care  Goal: Plan of Care Review  Outcome: Ongoing, Progressing  Flowsheets (Taken 11/2/2022 1431)  Progress: no change  Plan of Care Reviewed With: patient  Outcome Evaluation: vss. s/p I&D with Dr. brown. diet updated. plan for possible discharge tomorrow   Goal Outcome Evaluation:  Plan of Care Reviewed With: patient        Progress: no change  Outcome Evaluation: vss. s/p I&D with Dr. brown. diet updated. plan for possible discharge tomorrow

## 2022-11-02 NOTE — PROGRESS NOTES
Left PT and parapharyngeal abscess successfully drained.    This patient may d/c home tomorrow on saline gargles twice daily and po augmentin for 7 days.    Follow up with us in 10-14 days.    Limit activity for 7 days.      Soft diet 7 days.    Thank you

## 2022-11-02 NOTE — ANESTHESIA PREPROCEDURE EVALUATION
Anesthesia Evaluation     Patient summary reviewed and Nursing notes reviewed                Airway   Mallampati: I  TM distance: >3 FB  Neck ROM: full  Dental      Pulmonary - negative pulmonary ROS   Cardiovascular - negative cardio ROS    Rhythm: regular  Rate: normal        Neuro/Psych- negative ROS  GI/Hepatic/Renal/Endo - negative ROS     Musculoskeletal (-) negative ROS    Abdominal    Substance History - negative use     OB/GYN negative ob/gyn ROS         Other                        Anesthesia Plan    ASA 2     general     (Oral Shayla ETT    Tooth brush accident    I have reviewed the patient's history with the patient and the chart, including all pertinent laboratory results and imaging. I have explained the risks of anesthesia including but not limited to dental damage, corneal abrasion, nerve injury, MI, stroke, and death. Questions asked and answered. Anesthetic plan discussed with patient and team as indicated. Patient expressed understanding of the above.  )  intravenous induction     Anesthetic plan, risks, benefits, and alternatives have been provided, discussed and informed consent has been obtained with: patient, father and mother.        CODE STATUS:    Code Status (Patient has no pulse and is not breathing): CPR (Attempt to Resuscitate)  Medical Interventions (Patient has pulse or is breathing): Full

## 2022-11-02 NOTE — PLAN OF CARE
Problem: Adult Inpatient Plan of Care  Goal: Plan of Care Review  Outcome: Ongoing, Progressing  Flowsheets (Taken 11/2/2022 0328)  Outcome Evaluation: Arrived from ER with C/o of pain treated with PRN pain meds. NSR. AO x 4. Consent signed and in the chart. Possible I and D on throat abcess today.  Goal: Patient-Specific Goal (Individualized)  Outcome: Ongoing, Progressing  Goal: Absence of Hospital-Acquired Illness or Injury  Outcome: Ongoing, Progressing  Intervention: Identify and Manage Fall Risk  Recent Flowsheet Documentation  Taken 11/2/2022 0253 by Katarina Armstrong, RN  Safety Promotion/Fall Prevention:   activity supervised   assistive device/personal items within reach   clutter free environment maintained   fall prevention program maintained   lighting adjusted   mobility aid in reach   nonskid shoes/slippers when out of bed   safety round/check completed   toileting scheduled  Taken 11/2/2022 0048 by Katarina Armstrong RN  Safety Promotion/Fall Prevention:   activity supervised   assistive device/personal items within reach   clutter free environment maintained   fall prevention program maintained   lighting adjusted   mobility aid in reach   nonskid shoes/slippers when out of bed   safety round/check completed   toileting scheduled  Intervention: Prevent and Manage VTE (Venous Thromboembolism) Risk  Recent Flowsheet Documentation  Taken 11/2/2022 0253 by Katarina Armstrong, RN  Activity Management:   activity adjusted per tolerance   activity encouraged  Taken 11/2/2022 0048 by Katarina Armstrong, RN  Activity Management:   activity adjusted per tolerance   activity encouraged  Goal: Optimal Comfort and Wellbeing  Outcome: Ongoing, Progressing  Intervention: Monitor Pain and Promote Comfort  Recent Flowsheet Documentation  Taken 11/2/2022 0048 by Katarina Armstrong, RN  Pain Management Interventions: see MAR  Goal: Readiness for Transition of Care  Outcome: Ongoing,  Progressing  Intervention: Mutually Develop Transition Plan  Recent Flowsheet Documentation  Taken 11/2/2022 0038 by Katarina Armstrong, RN  Transportation Anticipated: family or friend will provide  Patient/Family Anticipated Services at Transition: none  Patient/Family Anticipates Transition to: home  Taken 11/2/2022 0036 by Katarina Armstrong, RN  Equipment Currently Used at Home: none   Goal Outcome Evaluation:              Outcome Evaluation: Arrived from ER with C/o of pain treated with PRN pain meds. NSR. AO x 4. Consent signed and in the chart. Possible I and D on throat abcess today.

## 2022-11-02 NOTE — H&P
HISTORY AND PHYSICAL   [facility]        Date of Admission: 2022  Patient Identification:  Name: Allie Sunshine  Age: 27 y.o.  Sex: female  :  1995  MRN: [id number]                     Primary Care Physician: Provider, No Known    Chief Complaint:  27 year old female who fell on the stairs while brushing her teeth 3 days ago; she has since developed pain of her throat which is worsening; she has also noted swelling on the left side; she has had difficulty speaking and swallowing; denies fever or chills    History of Present Illness:   As above    Past Medical History:  Nosignificant past medical history    Past Surgical History:  No past surgical history  .   Home Meds:  none      Allergies:  No Known Allergies  Immunizations:    There is no immunization history on file for this patient.  Social History:   Social History     Social History Narrative   • Not on file     Social History     Socioeconomic History   • Marital status: Single       Family History:  No family history on file.     Review of Systems  See history of present illness and past medical history.  Patient denies headache, dizziness, syncope,  change in vision, change in hearing, change in taste, changes in weight, changes in appetite, focal weakness, numbness, or paresthesia.  Patient denies chest pain, palpitations, dyspnea, orthopnea, PND, cough, sinus pressure, rhinorrhea, epistaxis, hemoptysis, nausea, vomiting,hematemesis, diarrhea, constipation or hematchezia.  Denies cold or heat intolerance, polydipsia, polyuria, polyphagia. Denies hematuria, pyuria, dysuria, hesitancy, frequency or urgency. Denies consumption of raw and under cooked meats foods or change in water source.  Denies fever, chills, sweats, night sweats.  Denies missing any routine medications. Remainder of ROS is negative.    Objective:  T Max 24 hrs: Temp (24hrs), Av.4 °F (36.3 °C), Min:97.4 °F (36.3 °C), Max:97.4 °F (36.3 °C)    Vitals Ranges:    Temp:  [97.4 °F (36.3 °C)] 97.4 °F (36.3 °C)  Heart Rate:  [69-94] 70  Resp:  [18] 18  BP: (112-124)/(61-74) 115/61      Exam:  /61   Pulse 70   Temp 97.4 °F (36.3 °C)   Resp 18   LMP 10/25/2022 (Approximate)   SpO2 96%     General Appearance:    Alert, cooperative, no distress, appears stated age   Head:    Normocephalic, without obvious abnormality, atraumatic   Eyes:    PERRL, conjunctivae/corneas clear, EOM's intact, both eyes   Ears:    Normal external ear canals, both ears   Nose:   Nares normal, septum midline, mucosa normal, no drainage    or sinus tenderness   Throat:   Lips, mucosa, and tongue normal; swelling left side   Neck:   Supple, symmetrical, trachea midline, no adenopathy;     thyroid:  no enlargement/tenderness/nodules; no carotid    bruit or JVD   Back:     Symmetric, no curvature, ROM normal, no CVA tenderness   Lungs:     Decreased breath sounds bilaterally, respirations unlabored   Chest Wall:    No tenderness or deformity    Heart:    Regular rate and rhythm, S1 and S2 normal, no murmur, rub   or gallop   Abdomen:     Soft, nontender, bowel sounds active all four quadrants,     no masses, no hepatomegaly, no splenomegaly   Extremities:   Extremities normal, atraumatic, no cyanosis or edema   Pulses:   2+ and symmetric all extremities   Skin:   Skin color, texture, turgor normal, no rashes or lesions   Lymph nodes:   Cervical, supraclavicular, and axillary nodes normal   Neurologic:   CNII-XII intact, normal strength, sensation intact throughout      .    Data Review:  Labs in chart were reviewed.  WBC   Date Value Ref Range Status   11/01/2022 16.37 (H) 3.40 - 10.80 10*3/mm3 Final     Hemoglobin   Date Value Ref Range Status   11/01/2022 14.2 12.0 - 15.9 g/dL Final     Hematocrit   Date Value Ref Range Status   11/01/2022 40.8 34.0 - 46.6 % Final     Platelets   Date Value Ref Range Status   11/01/2022 297 140 - 450 10*3/mm3 Final     Sodium   Date Value Ref Range Status    11/01/2022 139 136 - 145 mmol/L Final     Potassium   Date Value Ref Range Status   11/01/2022 3.7 3.5 - 5.2 mmol/L Final     Chloride   Date Value Ref Range Status   11/01/2022 104 98 - 107 mmol/L Final     CO2   Date Value Ref Range Status   11/01/2022 26.7 22.0 - 29.0 mmol/L Final     BUN   Date Value Ref Range Status   11/01/2022 10 6 - 20 mg/dL Final     Creatinine   Date Value Ref Range Status   11/01/2022 1.02 (H) 0.57 - 1.00 mg/dL Final     Glucose   Date Value Ref Range Status   11/01/2022 125 (H) 65 - 99 mg/dL Final     Calcium   Date Value Ref Range Status   11/01/2022 9.9 8.6 - 10.5 mg/dL Final     AST (SGOT)   Date Value Ref Range Status   11/01/2022 15 1 - 32 U/L Final     ALT (SGPT)   Date Value Ref Range Status   11/01/2022 9 1 - 33 U/L Final     Alkaline Phosphatase   Date Value Ref Range Status   11/01/2022 53 39 - 117 U/L Final                Imaging Results (All)     Procedure Component Value Units Date/Time    CT Angiogram Neck [[id number]] Collected: 11/01/22 1500     Updated: 11/01/22 1500    Narrative:      CT ANGIOGRAM OF THE NECK WITH CONTRAST     HISTORY: Trauma to left posterior pharynx, hematoma.     COMPARISON: None.     FINDINGS: The great vessels are arranged in a classic configuration.  There is 0% stenosis of the internal carotid arteries using NASCET  criteria. The vertebral arteries are codominant.     Decreased attenuation is noted involving the parapharyngeal soft tissues  on the left with mild peripheral enhancement. This measures  approximately 2.9 cm in AP dimension, 2.5 cm in transverse dimension and  2.6 cm in craniocaudal dimension. Decreased attenuation is also  appreciated more medially, extending to the midline within the soft  palate (more prominent superiorly). There is no evidence of active  extravasation. The findings are most suggestive of an abscess or of an  evolving abscess. There is a small collection of gas posterior laterally  to the left measuring  approximately 6 mm in size.       Impression:      Decreased attenuation with mild peripheral enhancement  involving the parapharyngeal soft tissues on the left measuring  approximately 2.9 x 2.5 x 2.6 cm in size with adjacent area of decreased  attenuation extending to the soft palate more prominent superiorly. The  findings are most suggestive of an abscess/developing abscess. This  study was hampered somewhat by patient motion but there was no evidence  of a pseudoaneurysm or of active extravasation.     The above information was called to and discussed with Dr. Eastman.                     Assessment:  Active Hospital Problems    Diagnosis  POA   • **Contusion of pharynx [S10.0XXA]  Yes      Resolved Hospital Problems   No resolved problems to display.   pharyngeal abscess  S.p fall  hyperglycemia    Plan:  Continue antibiotics, steroids  May need OR tomorrow  Npo after midnight just in case  Trend labs  DFelicitasw patient, ed provider and family     Lizette Muhammad MD  11/1/2022  21:09 EDT

## 2022-11-02 NOTE — PROGRESS NOTES
Name: Allie Sunshine ADMIT: 2022   : 1995  PCP: Provider, No Known    MRN: [id number] LOS: 0 days   AGE/SEX: 27 y.o. female  ROOM: [facility]     Subjective   Subjective   Patient is comfortable and in no apparent distress.  Family at bedside.  Voices no new concerns.  Still with mild sore throat.  Discussed with RN.    Review of Systems   Constitutional: Negative for chills and fever.   HENT: Positive for sore throat.    Respiratory: Negative for cough and shortness of breath.    Cardiovascular: Negative for chest pain and leg swelling.   Gastrointestinal: Negative for abdominal pain, constipation, diarrhea, nausea and vomiting.   Genitourinary: Negative for difficulty urinating and dysuria.   Musculoskeletal: Negative for gait problem and myalgias.        Objective   Objective   Vital Signs  Temp:  [97.9 °F (36.6 °C)-98.6 °F (37 °C)] 98.6 °F (37 °C)  Heart Rate:  [55-84] 68  Resp:  [14-17] 16  BP: (112-140)/(61-86) 118/78  SpO2:  [96 %-100 %] 100 %  on  Flow (L/min):  [2] 2;   Device (Oxygen Therapy): room air  Body mass index is 22.56 kg/m².     Physical Exam  Constitutional:       General: She is not in acute distress.     Appearance: She is not toxic-appearing.   Cardiovascular:      Rate and Rhythm: Normal rate.      Heart sounds: Normal heart sounds.   Pulmonary:      Effort: Pulmonary effort is normal.      Breath sounds: Normal breath sounds.   Abdominal:      General: Bowel sounds are normal.      Palpations: Abdomen is soft.   Musculoskeletal:      Right lower leg: No edema.      Left lower leg: No edema.   Skin:     General: Skin is warm and dry.   Neurological:      Mental Status: She is alert.       Results Review     I reviewed the patient's new clinical results.  Results from last 7 days   Lab Units 22  0615 22  1414   WBC 10*3/mm3 15.49* 16.37*   HEMOGLOBIN g/dL 12.4 14.2   PLATELETS 10*3/mm3 268 297     Results from last 7 days   Lab Units 22  0615 22  1414  11/01/22  1405   SODIUM mmol/L 139 139  --    POTASSIUM mmol/L 4.4 3.7  --    CHLORIDE mmol/L 103 104  --    CO2 mmol/L 26.0 26.7  --    BUN mg/dL 11 10  --    CREATININE mg/dL 0.77 1.02* 0.80   GLUCOSE mg/dL 115* 125*  --    EGFR mL/min/1.73 107.2 76.5  --      Results from last 7 days   Lab Units 11/01/22  1414   ALBUMIN g/dL 4.20   BILIRUBIN mg/dL 1.2   ALK PHOS U/L 53   AST (SGOT) U/L 15   ALT (SGPT) U/L 9     Results from last 7 days   Lab Units 11/02/22  0615 11/01/22  1414   CALCIUM mg/dL 9.3 9.9   ALBUMIN g/dL  --  4.20     Results from last 7 days   Lab Units 11/01/22  1447   LACTATE mmol/L 0.8     No results found for: HGBA1C, POCGLU    CT Angiogram Neck    Result Date: 11/2/2022  Decreased attenuation with mild peripheral enhancement involving the parapharyngeal soft tissues on the left measuring approximately 2.9 x 2.5 x 2.6 cm in size with adjacent area of decreased attenuation extending to the soft palate more prominent superiorly. The findings are most suggestive of an abscess/developing abscess. This study was hampered somewhat by patient motion but there was no evidence of a pseudoaneurysm or of active extravasation.  The above information was called to and discussed with Dr. Eastman.  This report was finalized on 11/2/2022 6:21 AM by Dr. Jensen Chambers M.D.      Scheduled Medications  ampicillin-sulbactam, 1.5 g, Intravenous, Q6H  dexamethasone, 4 mg, Intravenous, Q8H  First Mouthwash (Magic Mouthwash), 10 mL, Swish & Swallow, Q6H  spironolactone, 100 mg, Oral, Daily    Infusions  lactated ringers, 9 mL/hr, Last Rate: 9 mL/hr (11/02/22 1154)    Diet  Diet Soft Texture; Whole Foods       Assessment/Plan     Active Hospital Problems    Diagnosis  POA   • **Contusion of pharynx [S10.0XXA]  Yes   • Parapharyngeal abscess [J39.0]  Yes   • Leukocytosis [D72.829]  Yes      Resolved Hospital Problems   No resolved problems to display.       27 y.o. female admitted with Contusion of pharynx.      Contusion  of pharynx: Following fall with toothbrush trauma.  See plan below.  Hycet every 6 hours PRN.      Parapharyngeal abscess: Appreciate ENT.  Status post left PT and  parapharyngeal abscess drain on 11/20/2022.  Plan soft diet x7 days with follow-up ENT 10 to 14 days and Augmentin oral formulation x7 days with saline gargles twice daily.      Leukocytosis: WBC count.  Improving with trend.  BC x2 NGTD.  Most likely due to above.      · SCD for DVT prophylaxis.  · CPR  · Discussed with patient & family.  · Anticipate discharge hopeful DC home tomorrow pending dietary tolerance      ALEXI Bruno  [facility]  11/02/22  16:21 EDT

## 2022-11-02 NOTE — OP NOTE
Preop diagnosis: Left parapharyngeal space abscess    Postop diagnosis: Same    Procedure: Incision and drainage of left parapharyngeal space abscess    Surgeon: Melvin    Specimen: Culture and sensitivity left parapharyngeal space    Complications: None    Blood loss: 5 mL    Indications: 27-year-old female sustained blunt and penetrating trauma with a toothbrush when she fell on to the object causing bleeding and discomfort in the left side of her throat.  Patient developed progressive swelling with elevated white blood cell count.  CT showed evidence of a developing parapharyngeal space abscess.  The patient is brought to the operating room electively for incision and drainage.    Description of procedure: Patient was placed supine on the operating table where general anesthetic was administered via oral endotracheal tube.  Patient was positioned and draped in usual manner for examination of the pharynx.    The Nirmala Kailash gag was introduced into the oral cavity taking care not to injure the existing dentition.  Adequate visualization of the posterior pharyngeal wall was achieved.    There was obvious bulging of the soft palate just above the tonsil on the left side.  Also discovered a deep mucosal tear more inferiorly in the peritonsillar space.    I made a horizontal 10 mm incision with a #15 blade into the soft palate just above the margin of the palpable tonsil.  I submitted a tonsil clamp into this incision and immediately entered an abscess cavity yielding yellow pus.  At least 3 to 4 cc of pus immediately drained forth from the cavity.  I submitted a culture swab into the cavity.    We evacuated the cavity with a suction tip.  I then gently probed the abscess cavity with a long tonsil clamp to make sure there were no undrained areas of loculation or abscess.  I also submitted a clamp into the observed traumatic mucosal tear and this easily entered into the abscess space.    I then irrigated both of these  openings with a 60 cc syringe using a large Angiocath.  We irrigated approximately 180 cc of saline.    Once this was complete I held light pressure on the soft palate incision.  Bleeding was minimal at the conclusion of the procedure.    Patient was awakened and returned to recovery.

## 2022-11-02 NOTE — ANESTHESIA POSTPROCEDURE EVALUATION
Patient: Allie Sunshine    Procedure Summary     Date: 11/02/22 Room / Location: [facility] OR  / [facility] MAIN OR    Anesthesia Start: 1154 Anesthesia Stop: 1237    Procedure: Pharyngeal Abscess I & D (Throat) Diagnosis:     Surgeons: Waldo Charles MD Provider: Nabil Carpenter MD    Anesthesia Type: general ASA Status: 2          Anesthesia Type: general    Vitals  Vitals Value Taken Time   /86 11/02/22 1306   Temp 36.7 °C (98.1 °F) 11/02/22 1306   Pulse 64 11/02/22 1317   Resp 16 11/02/22 1306   SpO2 100 % 11/02/22 1318   Vitals shown include unvalidated device data.        Post Anesthesia Care and Evaluation    Patient location during evaluation: PACU  Patient participation: complete - patient participated  Level of consciousness: awake and alert  Pain management: adequate    Airway patency: patent  Anesthetic complications: No anesthetic complications    Cardiovascular status: acceptable  Respiratory status: acceptable  Hydration status: acceptable    Comments: ---------------------------               11/02/22                      1306         ---------------------------   BP:          132/86         Pulse:         58           Resp:          16           Temp:   36.7 °C (98.1 °F)   SpO2:          98%         ---------------------------

## 2022-11-02 NOTE — ANESTHESIA PROCEDURE NOTES
Airway  Urgency: elective    Date/Time: 11/2/2022 12:05 PM  Airway not difficult    General Information and Staff    Patient location during procedure: OR  Anesthesiologist: Nabil Carpenter MD  CRNA/CAA: Zack Gr CRNA    Indications and Patient Condition  Indications for airway management: airway protection    Preoxygenated: yes  Mask difficulty assessment: 1 - vent by mask    Final Airway Details  Final airway type: endotracheal airway      Successful airway: ETT and KOKI tube  Cuffed: yes   Successful intubation technique: direct laryngoscopy  Facilitating devices/methods: Bougie  Endotracheal tube insertion site: oral  Blade: Izquierdo  Blade size: 2  ETT size (mm): 6.5  Cormack-Lehane Classification: grade I - full view of glottis  Placement verified by: chest auscultation and capnometry   Measured from: teeth  Number of attempts at approach: 1  Assessment: lips, teeth, and gum same as pre-op and atraumatic intubation    Additional Comments  Pre 02 100%, SIVI, DL x1, atraumatic intubation, BLBS, Positive ETC02.

## 2022-11-03 VITALS
HEIGHT: 65 IN | DIASTOLIC BLOOD PRESSURE: 64 MMHG | OXYGEN SATURATION: 99 % | SYSTOLIC BLOOD PRESSURE: 106 MMHG | BODY MASS INDEX: 22.19 KG/M2 | TEMPERATURE: 98.1 F | RESPIRATION RATE: 16 BRPM | HEART RATE: 52 BPM | WEIGHT: 133.16 LBS

## 2022-11-03 PROBLEM — D72.829 LEUKOCYTOSIS: Status: RESOLVED | Noted: 2022-11-02 | Resolved: 2022-11-03

## 2022-11-03 LAB
ANION GAP SERPL CALCULATED.3IONS-SCNC: 7.1 MMOL/L (ref 5–15)
BUN SERPL-MCNC: 15 MG/DL (ref 6–20)
BUN/CREAT SERPL: 22.1 (ref 7–25)
CALCIUM SPEC-SCNC: 9.3 MG/DL (ref 8.6–10.5)
CHLORIDE SERPL-SCNC: 105 MMOL/L (ref 98–107)
CO2 SERPL-SCNC: 26.9 MMOL/L (ref 22–29)
CREAT SERPL-MCNC: 0.68 MG/DL (ref 0.57–1)
DEPRECATED RDW RBC AUTO: 38.2 FL (ref 37–54)
EGFRCR SERPLBLD CKD-EPI 2021: 121.1 ML/MIN/1.73
ERYTHROCYTE [DISTWIDTH] IN BLOOD BY AUTOMATED COUNT: 11.6 % (ref 12.3–15.4)
GLUCOSE SERPL-MCNC: 149 MG/DL (ref 65–99)
HCT VFR BLD AUTO: 34.6 % (ref 34–46.6)
HGB BLD-MCNC: 12 G/DL (ref 12–15.9)
MCH RBC QN AUTO: 31.1 PG (ref 26.6–33)
MCHC RBC AUTO-ENTMCNC: 34.7 G/DL (ref 31.5–35.7)
MCV RBC AUTO: 89.6 FL (ref 79–97)
PLATELET # BLD AUTO: 257 10*3/MM3 (ref 140–450)
PMV BLD AUTO: 9.8 FL (ref 6–12)
POTASSIUM SERPL-SCNC: 4.9 MMOL/L (ref 3.5–5.2)
RBC # BLD AUTO: 3.86 10*6/MM3 (ref 3.77–5.28)
SODIUM SERPL-SCNC: 139 MMOL/L (ref 136–145)
WBC NRBC COR # BLD: 14.23 10*3/MM3 (ref 3.4–10.8)

## 2022-11-03 PROCEDURE — 85027 COMPLETE CBC AUTOMATED: CPT | Performed by: NURSE PRACTITIONER

## 2022-11-03 PROCEDURE — 25010000002 AMPICILLIN-SULBACTAM PER 1.5 G: Performed by: HOSPITALIST

## 2022-11-03 PROCEDURE — 80048 BASIC METABOLIC PNL TOTAL CA: CPT | Performed by: NURSE PRACTITIONER

## 2022-11-03 PROCEDURE — 25010000002 DEXAMETHASONE SODIUM PHOSPHATE 20 MG/5ML SOLUTION: Performed by: OTOLARYNGOLOGY

## 2022-11-03 PROCEDURE — 25010000002 AMPICILLIN-SULBACTAM PER 1.5 G: Performed by: OTOLARYNGOLOGY

## 2022-11-03 PROCEDURE — G0378 HOSPITAL OBSERVATION PER HR: HCPCS

## 2022-11-03 RX ORDER — ONDANSETRON 4 MG/1
4 TABLET, FILM COATED ORAL EVERY 6 HOURS PRN
Qty: 30 TABLET | Refills: 0 | Status: SHIPPED | OUTPATIENT
Start: 2022-11-03

## 2022-11-03 RX ORDER — AMOXICILLIN AND CLAVULANATE POTASSIUM 875; 125 MG/1; MG/1
1 TABLET, FILM COATED ORAL 2 TIMES DAILY
Qty: 20 TABLET | Refills: 0 | Status: SHIPPED | OUTPATIENT
Start: 2022-11-03 | End: 2022-11-13

## 2022-11-03 RX ORDER — DIPHENHYDRAMINE HYDROCHLORIDE AND LIDOCAINE HYDROCHLORIDE AND ALUMINUM HYDROXIDE AND MAGNESIUM HYDRO
10 KIT 2 TIMES DAILY
Qty: 237 ML | Refills: 0 | Status: SHIPPED | OUTPATIENT
Start: 2022-11-03 | End: 2022-11-15

## 2022-11-03 RX ADMIN — DIPHENHYDRAMINE HYDROCHLORIDE AND LIDOCAINE HYDROCHLORIDE AND ALUMINUM HYDROXIDE AND MAGNESIUM HYDRO 10 ML: KIT at 00:08

## 2022-11-03 RX ADMIN — SPIRONOLACTONE 100 MG: 100 TABLET ORAL at 08:23

## 2022-11-03 RX ADMIN — ACETAMINOPHEN 650 MG: 325 TABLET, FILM COATED ORAL at 05:45

## 2022-11-03 RX ADMIN — DEXAMETHASONE SODIUM PHOSPHATE 4 MG: 4 INJECTION, SOLUTION INTRAMUSCULAR; INTRAVENOUS at 00:08

## 2022-11-03 RX ADMIN — DEXAMETHASONE SODIUM PHOSPHATE 4 MG: 4 INJECTION, SOLUTION INTRAMUSCULAR; INTRAVENOUS at 05:46

## 2022-11-03 RX ADMIN — AMPICILLIN SODIUM AND SULBACTAM SODIUM 3 G: 2; 1 INJECTION, POWDER, FOR SOLUTION INTRAMUSCULAR; INTRAVENOUS at 10:59

## 2022-11-03 RX ADMIN — DIPHENHYDRAMINE HYDROCHLORIDE AND LIDOCAINE HYDROCHLORIDE AND ALUMINUM HYDROXIDE AND MAGNESIUM HYDRO 10 ML: KIT at 11:00

## 2022-11-03 RX ADMIN — SODIUM CHLORIDE 1.5 G: 900 INJECTION INTRAVENOUS at 00:08

## 2022-11-03 RX ADMIN — DIPHENHYDRAMINE HYDROCHLORIDE AND LIDOCAINE HYDROCHLORIDE AND ALUMINUM HYDROXIDE AND MAGNESIUM HYDRO 10 ML: KIT at 05:45

## 2022-11-03 RX ADMIN — SODIUM CHLORIDE 1.5 G: 900 INJECTION INTRAVENOUS at 05:45

## 2022-11-03 NOTE — PLAN OF CARE
Problem: Adult Inpatient Plan of Care  Goal: Plan of Care Review  Outcome: Ongoing, Progressing  Flowsheets (Taken 11/3/2022 0330)  Outcome Evaluation: VSS. Minimal c/o of pain from incision site. Pt tolerating diet. Possible discharge in the AM>  Goal: Patient-Specific Goal (Individualized)  Outcome: Ongoing, Progressing  Goal: Absence of Hospital-Acquired Illness or Injury  Outcome: Ongoing, Progressing  Intervention: Identify and Manage Fall Risk  Recent Flowsheet Documentation  Taken 11/3/2022 0251 by Katarina Armstrong, RN  Safety Promotion/Fall Prevention:   activity supervised   clutter free environment maintained   assistive device/personal items within reach   fall prevention program maintained   lighting adjusted   mobility aid in reach   nonskid shoes/slippers when out of bed   safety round/check completed   toileting scheduled  Taken 11/3/2022 0020 by Katarina Armstrong, RN  Safety Promotion/Fall Prevention:   assistive device/personal items within reach   clutter free environment maintained   activity supervised   fall prevention program maintained   mobility aid in reach   lighting adjusted   nonskid shoes/slippers when out of bed   safety round/check completed   toileting scheduled  Taken 11/2/2022 2240 by Katarina Armstrong, RN  Safety Promotion/Fall Prevention:   activity supervised   assistive device/personal items within reach   clutter free environment maintained   fall prevention program maintained   lighting adjusted   mobility aid in reach   nonskid shoes/slippers when out of bed   safety round/check completed   toileting scheduled  Taken 11/2/2022 2030 by Katarina Armstrong, RN  Safety Promotion/Fall Prevention:   activity supervised   assistive device/personal items within reach   clutter free environment maintained   fall prevention program maintained   lighting adjusted   mobility aid in reach   nonskid shoes/slippers when out of bed   safety round/check completed   toileting  scheduled  Intervention: Prevent and Manage VTE (Venous Thromboembolism) Risk  Recent Flowsheet Documentation  Taken 11/3/2022 0251 by Katarina Armstrong, RN  Activity Management:   activity adjusted per tolerance   activity encouraged  Taken 11/3/2022 0020 by Katarina Armstrong, RN  Activity Management:   activity encouraged   activity adjusted per tolerance  Taken 11/2/2022 2240 by Katarina Armstrong, RN  Activity Management:   activity adjusted per tolerance   activity encouraged  Taken 11/2/2022 2030 by Katarina Armstrong, RN  Activity Management:   activity adjusted per tolerance   activity encouraged  Goal: Optimal Comfort and Wellbeing  Outcome: Ongoing, Progressing  Intervention: Provide Person-Centered Care  Recent Flowsheet Documentation  Taken 11/2/2022 2030 by Katarina Armstrong, RN  Trust Relationship/Rapport:   care explained   choices provided   emotional support provided   questions answered   questions encouraged   reassurance provided   thoughts/feelings acknowledged  Goal: Readiness for Transition of Care  Outcome: Ongoing, Progressing   Goal Outcome Evaluation:              Outcome Evaluation: VSS. Minimal c/o of pain from incision site. Pt tolerating diet. Possible discharge in the AM>

## 2022-11-03 NOTE — DISCHARGE INSTRUCTIONS
No strenuous activity.  No exercising.  Maintain a soft diet for 1 week.  Continue oral gargles 2 times daily.  Augmentin antibiotic for 7 days.  Follow-up with ENT, Dr Charles in 10-14days

## 2022-11-03 NOTE — PLAN OF CARE
Goal Outcome Evaluation:  Plan of Care Reviewed With: patient        Progress: improving  Outcome Evaluation: VSS. Discharge orders. Will discharge home.

## 2022-11-03 NOTE — DISCHARGE SUMMARY
Patient Name: Allie Sunshine  : 1995  MRN: [id number]    Date of Admission: 2022  Date of Discharge:  11/3/2022  Primary Care Physician: Provider, No Known      Chief Complaint:   neck swelling      Discharge Diagnoses     Active Hospital Problems    Diagnosis  POA   • **Contusion of pharynx [S10.0XXA]  Yes   • Parapharyngeal abscess [J39.0]  Yes      Resolved Hospital Problems    Diagnosis Date Resolved POA   • Leukocytosis [D72.829] 2022 Yes        Hospital Course     Ms. Sunshine is a 27 y.o. female with minimal medical history that presented after she fell with a toothbrush in her mouth.  She sustained a contusion of her pharynx.  In addition she developed parapharyngeal abscess.  ENT was consulted and she had the abscess drained on 2022.  ENT has approved discharge to home on a soft diet with Augmentin x7 days.  She will need to continue oral solution gargles as well.  Her mouth still hurts but it is controlled with Tylenol.  She is afebrile without leukocytosis today.  Blood cultures remain negative to date..  She is stable for discharge to home today to follow-up with ENT.    Day of Discharge     Subjective:  Pain controlled with Tylenol.  No difficulty swallowing soft diet.    Physical Exam:  Temp:  [98.1 °F (36.7 °C)-98.4 °F (36.9 °C)] 98.1 °F (36.7 °C)  Heart Rate:  [46-56] 52  Resp:  [12-16] 16  BP: (106-116)/(64-73) 106/64  Body mass index is 22.16 kg/m².  Physical Exam  Vitals reviewed.   Constitutional:       Appearance: She is well-developed. She is not ill-appearing.   HENT:      Head: Normocephalic and atraumatic.      Mouth/Throat:      Pharynx: Posterior oropharyngeal erythema present. No uvula swelling.      Comments: Pharynx -minimal swelling.  Erythema present.  Tenderness parapharyngeal abscess s/p drainage  Cardiovascular:      Rate and Rhythm: Normal rate and regular rhythm.   Abdominal:      General: Bowel sounds are normal. There is no distension.      Palpations:  Abdomen is soft. There is no mass.      Tenderness: There is no abdominal tenderness.      Hernia: No hernia is present.   Skin:     General: Skin is warm and dry.   Neurological:      Mental Status: She is alert and oriented to person, place, and time.   Psychiatric:         Behavior: Behavior normal.         Thought Content: Thought content normal.         Judgment: Judgment normal.         Consultants     Consult Orders (all) (From admission, onward)     Start     Ordered    11/01/22 1744  [facility] (on-call MD unless specified) Details  Once        Specialty:  Hospitalist  Provider:  Lizette Muhammad MD    11/01/22 1743    11/01/22 1440  ENT (on-call MD unless specified)  Once        Specialty:  Otolaryngology  Provider:  Kevon Knight MD    11/01/22 1439              Procedures     Pharyngeal Abscess I & D      Imaging Results (All)     Procedure Component Value Units Date/Time    CT Angiogram Neck [[id number]] Collected: 11/01/22 1500     Updated: 11/02/22 0624    Narrative:      CT ANGIOGRAM OF THE NECK WITH CONTRAST     HISTORY: Trauma to left posterior pharynx, hematoma.     COMPARISON: None.     FINDINGS: The great vessels are arranged in a classic configuration.  There is 0% stenosis of the internal carotid arteries using NASCET  criteria. The vertebral arteries are codominant.     Decreased attenuation is noted involving the parapharyngeal soft tissues  on the left with mild peripheral enhancement. This measures  approximately 2.9 cm in AP dimension, 2.5 cm in transverse dimension and  2.6 cm in craniocaudal dimension. Decreased attenuation is also  appreciated more medially, extending to the midline within the soft  palate (more prominent superiorly). There is no evidence of active  extravasation. The findings are most suggestive of an abscess or of an  evolving abscess. There is a small collection of gas posterior laterally  to the left measuring approximately 6 mm in size.       Impression:       Decreased attenuation with mild peripheral enhancement  involving the parapharyngeal soft tissues on the left measuring  approximately 2.9 x 2.5 x 2.6 cm in size with adjacent area of decreased  attenuation extending to the soft palate more prominent superiorly. The  findings are most suggestive of an abscess/developing abscess. This  study was hampered somewhat by patient motion but there was no evidence  of a pseudoaneurysm or of active extravasation.     The above information was called to and discussed with Dr. Eastman.     This report was finalized on 11/2/2022 6:21 AM by Dr. Jensen Chambers M.D.               Pertinent Labs     Results from last 7 days   Lab Units 11/03/22  0529 11/02/22  0615 11/01/22  1414   WBC 10*3/mm3 14.23* 15.49* 16.37*   HEMOGLOBIN g/dL 12.0 12.4 14.2   PLATELETS 10*3/mm3 257 268 297     Results from last 7 days   Lab Units 11/03/22  0529 11/02/22  0615 11/01/22  1414 11/01/22  1405   SODIUM mmol/L 139 139 139  --    POTASSIUM mmol/L 4.9 4.4 3.7  --    CHLORIDE mmol/L 105 103 104  --    CO2 mmol/L 26.9 26.0 26.7  --    BUN mg/dL 15 11 10  --    CREATININE mg/dL 0.68 0.77 1.02* 0.80   GLUCOSE mg/dL 149* 115* 125*  --    EGFR mL/min/1.73 121.1 107.2 76.5  --      Results from last 7 days   Lab Units 11/01/22  1414   ALBUMIN g/dL 4.20   BILIRUBIN mg/dL 1.2   ALK PHOS U/L 53   AST (SGOT) U/L 15   ALT (SGPT) U/L 9     Results from last 7 days   Lab Units 11/03/22  0529 11/02/22  0615 11/01/22  1414   CALCIUM mg/dL 9.3 9.3 9.9   ALBUMIN g/dL  --   --  4.20               Invalid input(s): LDLCALC  Results from last 7 days   Lab Units 11/02/22  1213 11/01/22  1452 11/01/22  1447   BLOODCX   --  No growth at 2 days No growth at 2 days   BODYFLDCX  Scant growth (1+) Gram Negative Bacilli*  Scant growth (1+) Normal Throat Whitney  --   --          Test Results Pending at Discharge     Pending Labs     Order Current Status    Blood Culture - Blood, Arm, Left Preliminary result    Blood Culture -  Blood, Arm, Left Preliminary result    Body Fluid Culture - Body Fluid, Oropharynx Preliminary result          Discharge Details        Discharge Medications      New Medications      Instructions Start Date   amoxicillin-clavulanate 875-125 MG per tablet  Commonly known as: Augmentin   1 tablet, Oral, 2 Times Daily      First Mouthwash (Magic Mouthwash) suspension   10 mL, Swish & Swallow, 2 Times Daily      HYDROcodone-acetaminophen 7.5-325 MG/15ML solution  Commonly known as: HYCET   10 mL, Oral, Every 6 Hours PRN      ondansetron 4 MG tablet  Commonly known as: ZOFRAN   4 mg, Oral, Every 6 Hours PRN         Continue These Medications      Instructions Start Date   acetaminophen 500 MG tablet  Commonly known as: TYLENOL   500 mg, Oral, Every 6 Hours PRN      ibuprofen 200 MG tablet  Commonly known as: ADVIL,MOTRIN   600 mg, Oral, Every 6 Hours PRN      Norethindrone Acet-Ethinyl Est 1.5-30 MG-MCG tablet   1 tablet, Oral, Daily      spironolactone 100 MG tablet  Commonly known as: ALDACTONE   100 mg, Oral, Daily      tretinoin 0.025 % cream  Commonly known as: RETIN-A   1 application, Topical, Nightly             No Known Allergies    Discharge Disposition:  Home or Self Care      Discharge Diet:  Diet Order   Procedures   • Diet Soft Texture; Whole Foods       Discharge Activity: As tolerated      CODE STATUS:    Code Status and Medical Interventions:   Ordered at: 11/01/22 1822     Code Status (Patient has no pulse and is not breathing):    CPR (Attempt to Resuscitate)     Medical Interventions (Patient has pulse or is breathing):    Full       No future appointments.   Follow-up Information     Provider, No Known .    Contact information:  [facility] [address] [phone]                         Time Spent on Discharge:  Greater than 38 minutes      ALEXI Branham  [facility]  11/03/22  15:20 EDT

## 2022-11-04 LAB
BACTERIA FLD CULT: ABNORMAL
GRAM STN SPEC: ABNORMAL
GRAM STN SPEC: ABNORMAL

## 2022-11-04 NOTE — CASE MANAGEMENT/SOCIAL WORK
Case Management Discharge Note      Final Note: Pt discharged home, no known needs. DEBRA Nolasco RN         Selected Continued Care - Discharged on 11/3/2022 Admission date: 11/1/2022 - Discharge disposition: Home or Self Care    Destination    No services have been selected for the patient.              Durable Medical Equipment    No services have been selected for the patient.              Dialysis/Infusion    No services have been selected for the patient.              Home Medical Care    No services have been selected for the patient.              Therapy    No services have been selected for the patient.              Community Resources    No services have been selected for the patient.              Community & DME    No services have been selected for the patient.                  Transportation Services  Private: Car    Final Discharge Disposition Code: 01 - home or self-care

## 2022-11-06 LAB
BACTERIA SPEC AEROBE CULT: NORMAL
BACTERIA SPEC AEROBE CULT: NORMAL

## (undated) DEVICE — PK ENT MINOR 40

## (undated) DEVICE — CATHETER,URETHRAL,REDRUBBER,STRL,12FR: Brand: MEDLINE INDUSTRIES, INC.

## (undated) DEVICE — ELECTRD BLD EZ CLN MOD XLNG 2.75IN

## (undated) DEVICE — IRRIGATOR BULB ASEPTO 60CC STRL

## (undated) DEVICE — COAGULATOR SXN MEGADYNE FT/CONTRL 10F 6IN

## (undated) DEVICE — Device

## (undated) DEVICE — GLV SURG SIGNATURE ESSENTIAL PF LTX SZ8

## (undated) DEVICE — KT ANTI FOG W/FLD AND SPNG

## (undated) DEVICE — SPONGE,TONSIL,DBL STRNG,XRAY,MED,1",STRL: Brand: MEDLINE INDUSTRIES, INC.